# Patient Record
Sex: FEMALE | Race: WHITE | NOT HISPANIC OR LATINO | Employment: OTHER | URBAN - METROPOLITAN AREA
[De-identification: names, ages, dates, MRNs, and addresses within clinical notes are randomized per-mention and may not be internally consistent; named-entity substitution may affect disease eponyms.]

---

## 2020-08-06 ENCOUNTER — TELEPHONE (OUTPATIENT)
Dept: SURGERY | Facility: CLINIC | Age: 80
End: 2020-08-06

## 2020-08-07 ENCOUNTER — TELEPHONE (OUTPATIENT)
Dept: SURGERY | Facility: CLINIC | Age: 80
End: 2020-08-07

## 2020-08-10 ENCOUNTER — INITIAL CONSULT (OUTPATIENT)
Dept: SURGERY | Facility: CLINIC | Age: 80
End: 2020-08-10
Payer: MEDICARE

## 2020-08-10 VITALS
DIASTOLIC BLOOD PRESSURE: 85 MMHG | WEIGHT: 143.88 LBS | TEMPERATURE: 98 F | BODY MASS INDEX: 25.49 KG/M2 | HEART RATE: 96 BPM | SYSTOLIC BLOOD PRESSURE: 139 MMHG | HEIGHT: 63 IN

## 2020-08-10 DIAGNOSIS — K31.89 GASTRIC MASS: ICD-10-CM

## 2020-08-10 DIAGNOSIS — K80.10 CALCULUS OF GALLBLADDER WITH CHRONIC CHOLECYSTITIS WITHOUT OBSTRUCTION: ICD-10-CM

## 2020-08-10 PROCEDURE — 1126F PR PAIN SEVERITY QUANTIFIED, NO PAIN PRESENT: ICD-10-PCS | Mod: S$GLB,,, | Performed by: SURGERY

## 2020-08-10 PROCEDURE — 1126F AMNT PAIN NOTED NONE PRSNT: CPT | Mod: S$GLB,,, | Performed by: SURGERY

## 2020-08-10 PROCEDURE — 99999 PR PBB SHADOW E&M-EST. PATIENT-LVL V: CPT | Mod: PBBFAC,,, | Performed by: SURGERY

## 2020-08-10 PROCEDURE — 1159F MED LIST DOCD IN RCRD: CPT | Mod: S$GLB,,, | Performed by: SURGERY

## 2020-08-10 PROCEDURE — 1101F PR PT FALLS ASSESS DOC 0-1 FALLS W/OUT INJ PAST YR: ICD-10-PCS | Mod: CPTII,S$GLB,, | Performed by: SURGERY

## 2020-08-10 PROCEDURE — 99204 PR OFFICE/OUTPT VISIT, NEW, LEVL IV, 45-59 MIN: ICD-10-PCS | Mod: S$GLB,,, | Performed by: SURGERY

## 2020-08-10 PROCEDURE — 99999 PR PBB SHADOW E&M-EST. PATIENT-LVL V: ICD-10-PCS | Mod: PBBFAC,,, | Performed by: SURGERY

## 2020-08-10 PROCEDURE — 99204 OFFICE O/P NEW MOD 45 MIN: CPT | Mod: S$GLB,,, | Performed by: SURGERY

## 2020-08-10 PROCEDURE — 1101F PT FALLS ASSESS-DOCD LE1/YR: CPT | Mod: CPTII,S$GLB,, | Performed by: SURGERY

## 2020-08-10 PROCEDURE — 1159F PR MEDICATION LIST DOCUMENTED IN MEDICAL RECORD: ICD-10-PCS | Mod: S$GLB,,, | Performed by: SURGERY

## 2020-08-10 RX ORDER — IBUPROFEN 100 MG/5ML
1000 SUSPENSION, ORAL (FINAL DOSE FORM) ORAL DAILY
COMMUNITY

## 2020-08-10 RX ORDER — PHENTERMINE HYDROCHLORIDE 37.5 MG/1
37.5 TABLET ORAL
COMMUNITY
Start: 2020-05-13 | End: 2020-08-26

## 2020-08-10 RX ORDER — BUSPIRONE HYDROCHLORIDE 15 MG/1
15 TABLET ORAL
COMMUNITY
Start: 2000-08-08

## 2020-08-10 RX ORDER — CICLOPIROX 80 MG/ML
SOLUTION TOPICAL NIGHTLY
COMMUNITY
Start: 2020-07-20

## 2020-08-10 RX ORDER — DENOSUMAB 60 MG/ML
60 INJECTION SUBCUTANEOUS
COMMUNITY
Start: 2017-09-14

## 2020-08-10 RX ORDER — TURMERIC 400 MG
400 CAPSULE ORAL DAILY
COMMUNITY
End: 2021-12-27

## 2020-08-10 RX ORDER — FAMOTIDINE 20 MG/1
20 TABLET, FILM COATED ORAL
COMMUNITY
Start: 2020-07-23 | End: 2020-09-14

## 2020-08-10 RX ORDER — ASPIRIN 81 MG/1
81 TABLET ORAL 2 TIMES DAILY
COMMUNITY
Start: 2018-12-21 | End: 2021-12-27

## 2020-08-10 RX ORDER — CALCIUM CITRATE/VITAMIN D3 315MG-6.25
TABLET ORAL DAILY
COMMUNITY

## 2020-08-10 RX ORDER — CHOLECALCIFEROL (VITAMIN D3) 25 MCG
2000 TABLET ORAL DAILY
COMMUNITY

## 2020-08-10 RX ORDER — DICLOFENAC SODIUM 1 MG/ML
1 SOLUTION/ DROPS OPHTHALMIC 4 TIMES DAILY
COMMUNITY
End: 2020-08-26

## 2020-08-10 RX ORDER — POLYETHYLENE GLYCOL 3350 17 G/17G
17 POWDER, FOR SOLUTION ORAL DAILY
COMMUNITY
Start: 2008-07-26

## 2020-08-10 RX ORDER — BUTYROSPERMUM PARKII(SHEA BUTTER), SIMMONDSIA CHINENSIS (JOJOBA) SEED OIL, ALOE BARBADENSIS LEAF EXTRACT .01; 1; 3.5 G/100G; G/100G; G/100G
100 LIQUID TOPICAL DAILY
COMMUNITY

## 2020-08-10 RX ORDER — EZETIMIBE 10 MG/1
10 TABLET ORAL DAILY
COMMUNITY
Start: 2020-07-23

## 2020-08-10 NOTE — PROGRESS NOTES
History & Physical    SUBJECTIVE:     History of Present Illness:  Patient is a 80 y.o. female presents with a gastric mass found on imaging. She was initially evaluated in the ED at [place] in July for abdominal pain and nausea and got a RUQUS which demonstrated gallstones along with a mass which on follow-up CT was originating from the lesser curve of the stomach. She states she has had 3 episodes of acute epigastric pain with radiation to her back over the last 2 months. Each episode lasts about 5 hours. Otherwise eating well, denies diarrhea/constipation/melena/hematochezia. Lost 25lbs over last 6 months on account of dieting. Denies fevers/chills. Walks with a cane. Had a similar episode of pain about 1.5 years ago and was attributed to large gallstone in neck of gallbladder.     Chief Complaint   Patient presents with    Consult       Review of patient's allergies indicates:   Allergen Reactions    Statins-hmg-coa reductase inhibitors Other (See Comments)       Current Outpatient Medications   Medication Sig Dispense Refill    ascorbic acid, vitamin C, (VITAMIN C) 1000 MG tablet       calcium citrate-vitamin D3 (CITRACAL + D MAXIMUM) 315 mg- 250 unit Tab       ciclopirox (PENLAC) 8 % Soln       diclofenac (VOLTAREN) 0.1 % ophthalmic solution 1 drop 4 (four) times daily.      ezetimibe (ZETIA) 10 mg tablet       famotidine (PEPCID) 20 MG tablet       multivitamin capsule Take 1 capsule by mouth once daily. PT TAKING 50+ WOMEN VIT      phentermine (ADIPEX-P) 37.5 mg tablet       phenylephrine (SUDAFED PE) 10 MG Tab       polyethylene glycol (MIRALAX) 17 gram/dose powder       Saccharomyces boulardii (FLORASTOR) 250 mg capsule Take 50 mg by mouth once daily.      suvorexant (BELSOMRA) 5 mg Tab Take 5 mg by mouth.      turmeric 400 mg Cap       vitamin D (VITAMIN D3) 1000 units Tab Take 2,000 Units by mouth.      aspirin (ECOTRIN) 81 MG EC tablet 81 mg.      busPIRone (BUSPAR) 15 MG tablet  "Take 15 mg by mouth. PT TAKING 1/3 OF PILL AS  NEEDED.L 5 MG      denosumab (PROLIA) 60 mg/mL Syrg PT GET INJECTION EVERY SIX MONTHS.       No current facility-administered medications for this visit.        Past Medical History:   Diagnosis Date    Breast CA     Hypercholesteremia      Past Surgical History:   Procedure Laterality Date    BREAST LUMPECTOMY      SUBTOTAL COLECTOMY       Family History   Problem Relation Age of Onset    Colon cancer Father     Hypertension Other      Social History     Tobacco Use    Smoking status: Never Smoker    Smokeless tobacco: Never Used   Substance Use Topics    Alcohol use: Yes     Alcohol/week: 0.0 standard drinks     Comment: occasionally    Drug use: No        Review of Systems:  Review of Systems   Constitutional: Negative for activity change, appetite change, chills, diaphoresis, fatigue, fever and unexpected weight change.   HENT: Negative for congestion, nosebleeds and trouble swallowing.    Eyes: Negative for visual disturbance.   Respiratory: Negative for cough, chest tightness and shortness of breath.    Cardiovascular: Negative for chest pain, palpitations and leg swelling.   Gastrointestinal: Positive for abdominal pain and nausea. Negative for abdominal distention, constipation, diarrhea and vomiting.   Genitourinary: Negative for decreased urine volume, difficulty urinating and dysuria (endorses foul smelling urine).   Musculoskeletal: Negative for gait problem, joint swelling and myalgias.   Skin: Negative for color change, pallor and rash.   Neurological: Negative for dizziness, numbness and headaches.   Psychiatric/Behavioral: Negative for agitation and confusion.       OBJECTIVE:     Vital Signs (Most Recent)  Temp: 97.6 °F (36.4 °C) (08/10/20 1514)  Pulse: 96 (08/10/20 1514)  BP: 139/85 (08/10/20 1514)  5' 3" (1.6 m)  65.3 kg (143 lb 14.4 oz)     Physical Exam:  Physical Exam  Constitutional:       General: She is not in acute distress.     " Appearance: Normal appearance. She is not ill-appearing, toxic-appearing or diaphoretic.   HENT:      Head: Normocephalic and atraumatic.      Nose: Nose normal.      Mouth/Throat:      Mouth: Mucous membranes are moist.      Pharynx: Oropharynx is clear.   Eyes:      Extraocular Movements: Extraocular movements intact.      Conjunctiva/sclera: Conjunctivae normal.      Pupils: Pupils are equal, round, and reactive to light.   Neck:      Musculoskeletal: Normal range of motion and neck supple.   Cardiovascular:      Rate and Rhythm: Normal rate and regular rhythm.      Pulses: Normal pulses.   Pulmonary:      Effort: Pulmonary effort is normal.      Breath sounds: Normal breath sounds.   Abdominal:      General: Abdomen is flat. There is no distension.      Palpations: Abdomen is soft.      Tenderness: There is abdominal tenderness (mild tenderness to deep palpation of epigastrium). There is no guarding or rebound.      Hernia: No hernia is present.   Musculoskeletal: Normal range of motion.   Skin:     General: Skin is warm and dry.      Capillary Refill: Capillary refill takes less than 2 seconds.   Neurological:      General: No focal deficit present.      Mental Status: She is alert and oriented to person, place, and time. Mental status is at baseline.   Psychiatric:         Mood and Affect: Mood normal.         Behavior: Behavior normal.         Thought Content: Thought content normal.         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 Also with what sounds like symptomatic cholelithiasis. Given size of tumor, and question of possible GIST vs lymphoma, we discussed EGD/bx as next  steps. If GIST is confirmed, can proceed with scheduling resection in addition to cholecystectomy at the same time.    PLAN:     -GI referral for EGD and biopsy  -Follow-up to review results and schedule gastric resection and cholecystectomy   -May continue taking current medications including aspirin

## 2020-08-10 NOTE — LETTER
August 10, 2020        Javier Coats MD  [address] [place] [address] Blvd  [address]  [facility]  [place] [address]             [facility]  [address]  [facility] [address]  Phone: [phone]   Patient: Simi Encinas   MR Number: [id number]   YOB: 1940   Date of Visit: 8/10/2020       Dear Dr. Coats:    Thank you for referring Simi Encinas to me for evaluation. Attached you will find relevant portions of my assessment and plan of care.    If you have questions, please do not hesitate to call me. I look forward to following Simi Encinas along with you.    Sincerely,      Chris Maldonado MD            CC  No Recipients    Enclosure

## 2020-08-10 NOTE — PROGRESS NOTES
Patient seen with Dr Yepez. 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 She underwent ultrasound which confirmed gallstones but also showed a mass in the pancreas or stomach. Subsequent CT scan, which I have personally reviewed, shows a 6-7 cm lobulated, exophytic mass which appears to arise from the posterior wall of the gastric body, suggesting a GIST tumor.   No evidence of metastatic disease. The tumor is clearly resectable and does not appear to invade the pancres      Long talk with Ms Encinas and her daughter in law. I have recommended EGD and biopsy to confirm the diagnosis (if this were a gastric lymphoma her treatment would be very different) followed by partial gastrectomy and cholecystectomy. I have told her that she is likely to need Gleevec therapy postop. She would like to see Dr Tomlinson, who is taking care of a family member.     She would also like to see Dr Spencer for her EGD and I will help to arrange this.     Copy Dr Spencer

## 2020-08-11 ENCOUNTER — TELEPHONE (OUTPATIENT)
Dept: SURGERY | Facility: CLINIC | Age: 80
End: 2020-08-11

## 2020-08-11 RX ORDER — DICLOFENAC SODIUM 10 MG/G
1 GEL TOPICAL 4 TIMES DAILY PRN
COMMUNITY

## 2020-08-13 ENCOUNTER — TELEPHONE (OUTPATIENT)
Dept: SURGERY | Facility: CLINIC | Age: 80
End: 2020-08-13

## 2020-08-18 ENCOUNTER — TELEPHONE (OUTPATIENT)
Dept: SURGERY | Facility: CLINIC | Age: 80
End: 2020-08-18

## 2020-08-24 ENCOUNTER — TELEPHONE (OUTPATIENT)
Dept: SURGERY | Facility: CLINIC | Age: 80
End: 2020-08-24

## 2020-08-24 NOTE — TELEPHONE ENCOUNTER
----- Message from Adriane Flores sent at 8/24/2020 11:10 AM CDT -----  Regarding: Pt  Reason: Pt calling to schedule surgery for 9/1. Please call     Communication: [phone]

## 2020-08-25 DIAGNOSIS — Z01.818 PRE-OP TESTING: ICD-10-CM

## 2020-08-25 DIAGNOSIS — K31.89 GASTRIC MASS: Primary | ICD-10-CM

## 2020-08-26 ENCOUNTER — OFFICE VISIT (OUTPATIENT)
Dept: SURGERY | Facility: CLINIC | Age: 80
End: 2020-08-26
Payer: MEDICARE

## 2020-08-26 ENCOUNTER — HOSPITAL ENCOUNTER (OUTPATIENT)
Dept: CARDIOLOGY | Facility: HOSPITAL | Age: 80
Discharge: HOME | End: 2020-08-26
Attending: INTERNAL MEDICINE
Payer: MEDICARE

## 2020-08-26 ENCOUNTER — HOSPITAL ENCOUNTER (OUTPATIENT)
Dept: CARDIOLOGY | Facility: CLINIC | Age: 80
Discharge: HOME | End: 2020-08-26
Payer: MEDICARE

## 2020-08-26 ENCOUNTER — OFFICE VISIT (OUTPATIENT)
Dept: CARDIOLOGY | Facility: CLINIC | Age: 80
End: 2020-08-26
Payer: MEDICARE

## 2020-08-26 VITALS
WEIGHT: 141.13 LBS | SYSTOLIC BLOOD PRESSURE: 111 MMHG | DIASTOLIC BLOOD PRESSURE: 75 MMHG | HEART RATE: 93 BPM | BODY MASS INDEX: 25.01 KG/M2 | TEMPERATURE: 98 F | HEIGHT: 63 IN

## 2020-08-26 VITALS
HEIGHT: 63 IN | DIASTOLIC BLOOD PRESSURE: 63 MMHG | HEART RATE: 87 BPM | WEIGHT: 141.56 LBS | SYSTOLIC BLOOD PRESSURE: 112 MMHG | BODY MASS INDEX: 25.08 KG/M2

## 2020-08-26 DIAGNOSIS — C49.A2 MALIGNANT GASTROINTESTINAL STROMAL TUMOR (GIST) OF STOMACH: ICD-10-CM

## 2020-08-26 DIAGNOSIS — R09.89 CAROTID BRUIT, UNSPECIFIED LATERALITY: ICD-10-CM

## 2020-08-26 DIAGNOSIS — Z01.810 PREOPERATIVE CARDIOVASCULAR EXAMINATION: ICD-10-CM

## 2020-08-26 DIAGNOSIS — K31.89 GASTRIC MASS: Primary | ICD-10-CM

## 2020-08-26 DIAGNOSIS — K80.10 CALCULUS OF GALLBLADDER WITH CHRONIC CHOLECYSTITIS WITHOUT OBSTRUCTION: Primary | ICD-10-CM

## 2020-08-26 DIAGNOSIS — K31.89 GASTRIC MASS: ICD-10-CM

## 2020-08-26 DIAGNOSIS — Z01.810 PREOPERATIVE CARDIOVASCULAR EXAMINATION: Primary | ICD-10-CM

## 2020-08-26 LAB
LEFT CBA DIAS: 14 CM/S
LEFT CBA SYS: 65 CM/S
LEFT CCA DIST DIAS: 13 CM/S
LEFT CCA DIST SYS: 60 CM/S
LEFT CCA MID DIAS: 13 CM/S
LEFT CCA MID SYS: 78 CM/S
LEFT CCA PROX DIAS: 10 CM/S
LEFT CCA PROX SYS: 84 CM/S
LEFT ECA DIAS: 0 CM/S
LEFT ECA SYS: 67 CM/S
LEFT ICA DIST DIAS: 16 CM/S
LEFT ICA DIST SYS: 61 CM/S
LEFT ICA MID DIAS: 19 CM/S
LEFT ICA MID SYS: 76 CM/S
LEFT ICA PROX DIAS: 26 CM/S
LEFT ICA PROX SYS: 88 CM/S
LEFT VERTEBRAL DIAS: 18 CM/S
LEFT VERTEBRAL SYS: 59 CM/S
OHS CV CAROTID RIGHT ICA EDV HIGHEST: 23
OHS CV CAROTID ULTRASOUND LEFT ICA/CCA RATIO: 1.47
OHS CV CAROTID ULTRASOUND RIGHT ICA/CCA RATIO: 1.48
OHS CV PV CAROTID LEFT HIGHEST CCA: 84
OHS CV PV CAROTID LEFT HIGHEST ICA: 88
OHS CV PV CAROTID RIGHT HIGHEST CCA: 80
OHS CV PV CAROTID RIGHT HIGHEST ICA: 99
OHS CV US CAROTID LEFT HIGHEST EDV: 26
RIGHT ARM DIASTOLIC BLOOD PRESSURE: 63 MMHG
RIGHT ARM SYSTOLIC BLOOD PRESSURE: 112 MMHG
RIGHT CBA DIAS: 10 CM/S
RIGHT CBA SYS: 60 CM/S
RIGHT CCA DIST DIAS: 12 CM/S
RIGHT CCA DIST SYS: 67 CM/S
RIGHT CCA MID DIAS: 13 CM/S
RIGHT CCA MID SYS: 80 CM/S
RIGHT CCA PROX DIAS: 8 CM/S
RIGHT CCA PROX SYS: 80 CM/S
RIGHT ECA DIAS: 0 CM/S
RIGHT ECA SYS: 67 CM/S
RIGHT ICA DIST DIAS: 18 CM/S
RIGHT ICA DIST SYS: 68 CM/S
RIGHT ICA MID DIAS: 23 CM/S
RIGHT ICA MID SYS: 99 CM/S
RIGHT ICA PROX DIAS: 15 CM/S
RIGHT ICA PROX SYS: 68 CM/S
RIGHT VERTEBRAL DIAS: 10 CM/S
RIGHT VERTEBRAL SYS: 49 CM/S

## 2020-08-26 PROCEDURE — 99999 PR PBB SHADOW E&M-EST. PATIENT-LVL IV: ICD-10-PCS | Mod: PBBFAC,,, | Performed by: SURGERY

## 2020-08-26 PROCEDURE — 93880 EXTRACRANIAL BILAT STUDY: CPT

## 2020-08-26 PROCEDURE — 93000 EKG 12-LEAD: ICD-10-PCS | Mod: S$GLB,,, | Performed by: INTERNAL MEDICINE

## 2020-08-26 PROCEDURE — 1126F PR PAIN SEVERITY QUANTIFIED, NO PAIN PRESENT: ICD-10-PCS | Mod: S$GLB,,, | Performed by: INTERNAL MEDICINE

## 2020-08-26 PROCEDURE — 93000 ELECTROCARDIOGRAM COMPLETE: CPT | Mod: S$GLB,,, | Performed by: INTERNAL MEDICINE

## 2020-08-26 PROCEDURE — 93880 CV US DOPPLER CAROTID (CUPID ONLY): ICD-10-PCS | Mod: 26,,, | Performed by: INTERNAL MEDICINE

## 2020-08-26 PROCEDURE — 1159F PR MEDICATION LIST DOCUMENTED IN MEDICAL RECORD: ICD-10-PCS | Mod: S$GLB,,, | Performed by: INTERNAL MEDICINE

## 2020-08-26 PROCEDURE — 99499 UNLISTED E&M SERVICE: CPT | Mod: S$GLB,,, | Performed by: SURGERY

## 2020-08-26 PROCEDURE — 1126F AMNT PAIN NOTED NONE PRSNT: CPT | Mod: S$GLB,,, | Performed by: INTERNAL MEDICINE

## 2020-08-26 PROCEDURE — 99999 PR PBB SHADOW E&M-EST. PATIENT-LVL V: ICD-10-PCS | Mod: PBBFAC,,, | Performed by: INTERNAL MEDICINE

## 2020-08-26 PROCEDURE — 99999 PR PBB SHADOW E&M-EST. PATIENT-LVL V: CPT | Mod: PBBFAC,,, | Performed by: INTERNAL MEDICINE

## 2020-08-26 PROCEDURE — 99203 PR OFFICE/OUTPT VISIT, NEW, LEVL III, 30-44 MIN: ICD-10-PCS | Mod: S$GLB,,, | Performed by: INTERNAL MEDICINE

## 2020-08-26 PROCEDURE — 99499 NO LOS: ICD-10-PCS | Mod: S$GLB,,, | Performed by: SURGERY

## 2020-08-26 PROCEDURE — 99999 PR PBB SHADOW E&M-EST. PATIENT-LVL IV: CPT | Mod: PBBFAC,,, | Performed by: SURGERY

## 2020-08-26 PROCEDURE — 1159F MED LIST DOCD IN RCRD: CPT | Mod: S$GLB,,, | Performed by: INTERNAL MEDICINE

## 2020-08-26 PROCEDURE — 1101F PR PT FALLS ASSESS DOC 0-1 FALLS W/OUT INJ PAST YR: ICD-10-PCS | Mod: CPTII,S$GLB,, | Performed by: INTERNAL MEDICINE

## 2020-08-26 PROCEDURE — 93880 EXTRACRANIAL BILAT STUDY: CPT | Mod: 26,,, | Performed by: INTERNAL MEDICINE

## 2020-08-26 PROCEDURE — 1101F PT FALLS ASSESS-DOCD LE1/YR: CPT | Mod: CPTII,S$GLB,, | Performed by: INTERNAL MEDICINE

## 2020-08-26 PROCEDURE — 99203 OFFICE O/P NEW LOW 30 MIN: CPT | Mod: S$GLB,,, | Performed by: INTERNAL MEDICINE

## 2020-08-26 RX ORDER — METRONIDAZOLE 500 MG/100ML
500 INJECTION, SOLUTION INTRAVENOUS
Status: CANCELLED | OUTPATIENT
Start: 2020-09-01

## 2020-08-26 RX ORDER — SODIUM CHLORIDE 9 MG/ML
INJECTION, SOLUTION INTRAVENOUS CONTINUOUS
Status: CANCELLED | OUTPATIENT
Start: 2020-08-26

## 2020-08-26 RX ORDER — ENOXAPARIN SODIUM 100 MG/ML
40 INJECTION SUBCUTANEOUS EVERY 24 HOURS
Status: CANCELLED | OUTPATIENT
Start: 2020-08-26

## 2020-08-26 RX ORDER — PANTOPRAZOLE SODIUM 40 MG/1
TABLET, DELAYED RELEASE ORAL
COMMUNITY
Start: 2020-08-18

## 2020-08-26 RX ORDER — LIDOCAINE HYDROCHLORIDE 10 MG/ML
1 INJECTION, SOLUTION EPIDURAL; INFILTRATION; INTRACAUDAL; PERINEURAL ONCE
Status: CANCELLED | OUTPATIENT
Start: 2020-08-26 | End: 2020-08-26

## 2020-08-26 NOTE — PROGRESS NOTES
SUBJECTIVE:      History of Present Illness:  Patient is a 80 y.o. female presents with a gastric mass found on imaging. She was initially evaluated in the ED at [place] in July for abdominal pain and nausea and got a RUQUS which demonstrated gallstones along with a mass which on follow-up CT was originating from the lesser curve of the stomach. She states she has had 3 episodes of acute epigastric pain with radiation to her back over the last 2 months. Each episode lasts about 5 hours. Otherwise eating well, denies diarrhea/constipation/melena/hematochezia. Lost 25lbs over last 6 months on account of dieting. Denies fevers/chills. Walks with a cane. Had a similar episode of pain about 1.5 years ago and was attributed to large gallstone in neck of gallbladder.     8/26/20 clinic follow-up after EGD with biopsy for preoperative planning. Feeling well today, no current abdominal pain, no nausea/vomiting, normal BMs, denies fevers/chills/cough/dyspnea.         Chief Complaint   Patient presents with    Consult              Review of patient's allergies indicates:   Allergen Reactions    Statins-hmg-coa reductase inhibitors Other (See Comments)         Current Medications          Current Outpatient Medications   Medication Sig Dispense Refill    ascorbic acid, vitamin C, (VITAMIN C) 1000 MG tablet          calcium citrate-vitamin D3 (CITRACAL + D MAXIMUM) 315 mg- 250 unit Tab          ciclopirox (PENLAC) 8 % Soln          diclofenac (VOLTAREN) 0.1 % ophthalmic solution 1 drop 4 (four) times daily.        ezetimibe (ZETIA) 10 mg tablet          famotidine (PEPCID) 20 MG tablet          multivitamin capsule Take 1 capsule by mouth once daily. PT TAKING 50+ WOMEN VIT        phentermine (ADIPEX-P) 37.5 mg tablet          phenylephrine (SUDAFED PE) 10 MG Tab          polyethylene glycol (MIRALAX) 17 gram/dose powder          Saccharomyces boulardii (FLORASTOR) 250 mg capsule Take 50 mg by mouth once  daily.        suvorexant (BELSOMRA) 5 mg Tab Take 5 mg by mouth.        turmeric 400 mg Cap          vitamin D (VITAMIN D3) 1000 units Tab Take 2,000 Units by mouth.        aspirin (ECOTRIN) 81 MG EC tablet 81 mg.        busPIRone (BUSPAR) 15 MG tablet Take 15 mg by mouth. PT TAKING 1/3 OF PILL AS  NEEDED.L 5 MG        denosumab (PROLIA) 60 mg/mL Syrg PT GET INJECTION EVERY SIX MONTHS.          No current facility-administered medications for this visit.                 Past Medical History:   Diagnosis Date    Breast CA      Hypercholesteremia              Past Surgical History:   Procedure Laterality Date    BREAST LUMPECTOMY        SUBTOTAL COLECTOMY                Family History   Problem Relation Age of Onset    Colon cancer Father      Hypertension Other        Social History            Tobacco Use    Smoking status: Never Smoker    Smokeless tobacco: Never Used   Substance Use Topics    Alcohol use: Yes       Alcohol/week: 0.0 standard drinks       Comment: occasionally    Drug use: No         Review of Systems:  Review of Systems   Constitutional: Negative for activity change, appetite change, chills, diaphoresis, fatigue, fever and unexpected weight change.   HENT: Negative for congestion, nosebleeds and trouble swallowing.    Eyes: Negative for visual disturbance.   Respiratory: Negative for cough, chest tightness and shortness of breath.    Cardiovascular: Negative for chest pain, palpitations and leg swelling.   Gastrointestinal: Positive for abdominal pain and nausea. Negative for abdominal distention, constipation, diarrhea and vomiting.   Genitourinary: Negative for decreased urine volume, difficulty urinating and dysuria   Musculoskeletal: Negative for gait problem, joint swelling and myalgias.   Skin: Negative for color change, pallor and rash.   Neurological: Negative for dizziness, numbness and headaches.   Psychiatric/Behavioral: Negative for agitation and confusion.          OBJECTIVE:      Vital Signs (Most Recent)  See nursing flowsheet, vitals reviewed     Physical Exam  Constitutional:       General: She is not in acute distress.     Appearance: Normal appearance. She is not ill-appearing, toxic-appearing or diaphoretic.   HENT:      Head: Normocephalic and atraumatic.      Nose: Nose normal.      Mouth/Throat:      Mouth: Mucous membranes are moist.      Pharynx: Oropharynx is clear.   Eyes:      Extraocular Movements: Extraocular movements intact.      Conjunctiva/sclera: Conjunctivae normal.      Pupils: Pupils are equal, round, and reactive to light.   Neck:      Musculoskeletal: Normal range of motion and neck supple.   Cardiovascular:      Rate and Rhythm: Normal rate and regular rhythm.      Pulses: Normal pulses.   Pulmonary:      Effort: Pulmonary effort is normal.      Breath sounds: Normal breath sounds.   Abdominal:      General: Abdomen is flat. There is no distension.      Palpations: Abdomen is soft.      Tenderness: There is abdominal tenderness (mild tenderness to deep palpation of epigastrium). There is no guarding or rebound.      Hernia: No hernia is present.   Musculoskeletal: Normal range of motion.   Skin:     General: Skin is warm and dry.      Capillary Refill: Capillary refill takes less than 2 seconds.   Neurological:      General: No focal deficit present.      Mental Status: She is alert and oriented to person, place, and time. Mental status is at baseline.   Psychiatric:         Mood and Affect: Mood normal.         Behavior: Behavior normal.         Thought Content: Thought content normal.         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 Also with what sounds like symptomatic cholelithiasis. We reviewed risks, benefits, and alternative to surgery and the patient consented for surgery. Will proceed with scheduling partial gastrectomy in addition to cholecystectomy at the same time for 9/1/20.     PLAN:     -Partial gastrectomy/wedge resection, EGD, and cholecystectomy 9/1/20  -Preoperative labs ordered for morning of surgery, EKG today, COVID-19 ordered pre-op  -May continue taking current medications    I have personally taken the history and examined this patient and agree with the resident's note as stated above.  I discussed the surgical plan with Ms Encinas and her daughter: partial gastrectomy and cholecystectomy using a robotic minimally-invasive approach. She understands that open conversion may be needed. She is also aware that she will need to see Dr Tomlinson postop and will probably need adjuvant Gleevec therapy.

## 2020-08-26 NOTE — LETTER
August 26, 2020      Chris Maldonado MD  [address]  [facility] [address]           [facility] 3rd Floor  [address]  [facility] [address]  Phone: [phone]          Patient: Simi Encinas   MR Number: [id number]   YOB: 1940   Date of Visit: 8/26/2020       Dear Dr. Chris Maldonado:    Thank you for referring Simi Encinas to me for evaluation. Attached you will find relevant portions of my assessment and plan of care.    If you have questions, please do not hesitate to call me. I look forward to following Simi Encinas along with you.    Sincerely,    Mau Soler MD    Enclosure  CC:  No Recipients    If you would like to receive this communication electronically, please contact [email or web address] or [phone] to request more information on [employer] access.    For providers and/or their staff who would like to refer a patient to [facility], please contact us through our one-stop-shop provider referral line, [facility], at [phone].    If you feel you have received this communication in error or would no longer like to receive these types of communications, please e-mail [email or web address]

## 2020-08-26 NOTE — PROGRESS NOTES
Subjective:   Patient ID:  Simi Encinas is a 80 y.o. 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  Her symptoms were from the gall bladder and the stomach malignancy was a fortuitous incidental finding.    She overall is doing well with no new symptoms or cardiovascular complaints and no change in exercise capacity.  She denies chest discomfort, BO, palpitations, PND/orthopnea, lightheadedness and syncope.    She mowed her lawn yesterday without difficulty.      Past Medical History:   Diagnosis Date    Breast CA     Hypercholesteremia        Past Surgical History:   Procedure Laterality Date    BREAST LUMPECTOMY      SUBTOTAL COLECTOMY         Social History     Tobacco Use    Smoking status: Never Smoker    Smokeless tobacco: Never Used   Substance Use Topics    Alcohol use: Yes     Alcohol/week: 0.0 standard drinks     Comment: occasionally    Drug use: No       Family History   Problem Relation Age of Onset    Colon cancer Father     Hyperlipidemia Father     Heart attack Father     Heart disease Father     Hypertension Father     Hyperlipidemia Mother     Heart disease Mother     Hypertension Mother     Hypertension Other        Current Outpatient Medications   Medication Sig    ascorbic acid, vitamin C, (VITAMIN C) 1000 MG tablet Take 1,000 mg by mouth once daily.     aspirin (ECOTRIN) 81 MG EC tablet Take 81 mg by mouth 2 (two) times daily.     busPIRone (BUSPAR) 15 MG tablet Take 15 mg by mouth. PT TAKING 1/3 OF PILL AS  NEEDED 5 MG    calcium citrate-vitamin D3 (CITRACAL + D MAXIMUM) 315 mg- 250 unit Tab Take by mouth once daily.     ciclopirox (PENLAC) 8 % Soln Apply topically nightly. Left great toe    denosumab (PROLIA) 60 mg/mL Syrg Inject 60 mg into the skin every 6 (six) months. PT GET INJECTION EVERY SIX  MONTHS.last dose 5/2020    diclofenac sodium (VOLTAREN) 1 % Gel Apply 1 g topically 4 (four) times daily as needed (To on bilateal knees if needed.).    ezetimibe (ZETIA) 10 mg tablet Take 10 mg by mouth once daily. allergic to statins    famotidine (PEPCID) 20 MG tablet Take 20 mg by mouth.     multivitamin capsule Take 1 capsule by mouth once daily. PT TAKING 50+ WOMEN VIT    pantoprazole (PROTONIX) 40 MG tablet TK 1 T PO QAM    phenylephrine (SUDAFED PE) 10 MG Tab Take 10 mg by mouth every 4 (four) hours as needed. sinuses    polyethylene glycol (MIRALAX) 17 gram/dose powder Take 17 g by mouth once daily.     Saccharomyces boulardii (FLORASTOR) 250 mg capsule Take 100 mg by mouth once daily. Women's probiotics    suvorexant (BELSOMRA) 5 mg Tab Take 5 mg by mouth nightly as needed (insomina if needed).     turmeric 400 mg Cap Take 400 mg by mouth once daily.     vitamin D (VITAMIN D3) 1000 units Tab Take 2,000 Units by mouth once daily.      No current facility-administered medications for this visit.        Review of patient's allergies indicates:   Allergen Reactions    Statins-hmg-coa reductase inhibitors Other (See Comments)       Review of Systems   Constitution: Negative.   HENT: Negative.    Eyes: Negative.    Cardiovascular: Negative.  Negative for chest pain, dyspnea on exertion, near-syncope, orthopnea and palpitations.   Respiratory: Negative.  Negative for cough and shortness of breath.    Endocrine: Negative.    Hematologic/Lymphatic: Negative.    Skin: Negative.    Musculoskeletal: Negative.    Gastrointestinal: Positive for abdominal pain.   Genitourinary: Negative.    Neurological: Negative.    Psychiatric/Behavioral: Negative.      Objective:   Physical Exam   Constitutional: She is oriented to person, place, and time. She appears well-developed and well-nourished.   HENT:   Head: Normocephalic and atraumatic.   Mouth/Throat: Oropharynx is clear and moist.   Eyes: Conjunctivae and EOM  are normal. No scleral icterus.   Neck: Normal range of motion. Neck supple. No JVD present.   Cardiovascular: Normal rate, regular rhythm, normal heart sounds and intact distal pulses. Exam reveals no gallop and no friction rub.   No murmur heard.  Soft, brief carotid bruit over the right side of the neck   Pulmonary/Chest: Effort normal and breath sounds normal. She has no wheezes. She has no rales.   Abdominal: Soft. Bowel sounds are normal. She exhibits no distension. There is no abdominal tenderness.   Musculoskeletal: Normal range of motion.         General: No edema.   Neurological: She is alert and oriented to person, place, and time.   Skin: Skin is warm and dry. No rash noted. No erythema.   Psychiatric: She has a normal mood and affect. Her behavior is normal. Judgment and thought content normal.   Vitals reviewed.      Lab Results   Component Value Date    WBC 11.89 (H) 06/14/2008    HGB 12.0 06/14/2008    HCT 37.2 06/14/2008    MCV 87.5 06/14/2008     06/14/2008         Chemistry        Component Value Date/Time     06/14/2008 0310    K 3.8 06/14/2008 0310     06/14/2008 0310    CO2 22 (L) 06/14/2008 0310    BUN 8 06/14/2008 0310    CREATININE 0.8 06/14/2008 0310     (H) 06/14/2008 0310        Component Value Date/Time    CALCIUM 8.4 (L) 06/14/2008 0310            No results found for: CHOL  No results found for: HDL  No results found for: LDLCALC  No results found for: TRIG  No results found for: CHOLHDL    No results found for: TSH    No results found for: HGBA1C      Assessment:     1. Preoperative cardiovascular examination    2. Gastric mass    3.      Right carotid bruit    Plan:     She has no angina, heart failure, or unstable arrhythmia.  No further cardiovascular testing is required prior to proceeding to the operating room.    Carotid u/s given subtle right neck bruit.  This is not an impediment to proceeding with the operation.    Diet/exercise goals  reinforced.    Hand washing and social distancing stressed.

## 2020-08-29 ENCOUNTER — LAB VISIT (OUTPATIENT)
Dept: URGENT CARE | Facility: CLINIC | Age: 80
DRG: 327 | End: 2020-08-29
Payer: MEDICARE

## 2020-08-29 ENCOUNTER — ANESTHESIA EVENT (OUTPATIENT)
Dept: SURGERY | Facility: HOSPITAL | Age: 80
DRG: 327 | End: 2020-08-29
Payer: MEDICARE

## 2020-08-29 VITALS — TEMPERATURE: 99 F

## 2020-08-29 DIAGNOSIS — Z01.818 PRE-OP TESTING: ICD-10-CM

## 2020-08-29 PROCEDURE — U0003 INFECTIOUS AGENT DETECTION BY NUCLEIC ACID (DNA OR RNA); SEVERE ACUTE RESPIRATORY SYNDROME CORONAVIRUS 2 (SARS-COV-2) (CORONAVIRUS DISEASE [COVID-19]), AMPLIFIED PROBE TECHNIQUE, MAKING USE OF HIGH THROUGHPUT TECHNOLOGIES AS DESCRIBED BY CMS-2020-01-R: HCPCS

## 2020-08-30 LAB — SARS-COV-2 RNA RESP QL NAA+PROBE: NOT DETECTED

## 2020-08-31 ENCOUNTER — TELEPHONE (OUTPATIENT)
Dept: SURGERY | Facility: CLINIC | Age: 80
End: 2020-08-31

## 2020-08-31 NOTE — TELEPHONE ENCOUNTER
Pre-operative instructions given to pt verbally. Instructed to park on second level in patient parking garage and report to Day of Surgery for 5 am. Taught to shower with Hibiclens/drink Recovery Aid tonight and morning of surgery. Instructed to wear comfortable clothes, bring robe and wear  shoes for after surgery. Instructed nothing to eat after midnight. Instructed the only medications to take before surgery is Protonix with sip of water. COVID negative. Pt verbalized understanding of instructions. Pt would like to speak with Dr. Maldonado, notified Dr. Maldonado.

## 2020-09-01 ENCOUNTER — ANESTHESIA (OUTPATIENT)
Dept: SURGERY | Facility: HOSPITAL | Age: 80
DRG: 327 | End: 2020-09-01
Payer: MEDICARE

## 2020-09-01 ENCOUNTER — HOSPITAL ENCOUNTER (INPATIENT)
Facility: HOSPITAL | Age: 80
LOS: 3 days | Discharge: HOME | DRG: 327 | End: 2020-09-04
Attending: SURGERY | Admitting: SURGERY
Payer: MEDICARE

## 2020-09-01 DIAGNOSIS — C49.A2 MALIGNANT GASTROINTESTINAL STROMAL TUMOR (GIST) OF STOMACH: ICD-10-CM

## 2020-09-01 DIAGNOSIS — K31.89 GASTRIC MASS: ICD-10-CM

## 2020-09-01 LAB
ABO + RH BLD: NORMAL
ALBUMIN SERPL BCP-MCNC: 3 G/DL (ref 3.5–5.2)
ALP SERPL-CCNC: 113 U/L (ref 55–135)
ALT SERPL W/O P-5'-P-CCNC: 24 U/L (ref 10–44)
ANION GAP SERPL CALC-SCNC: 8 MMOL/L (ref 8–16)
ANION GAP SERPL CALC-SCNC: 9 MMOL/L (ref 8–16)
AST SERPL-CCNC: 29 U/L (ref 10–40)
BASOPHILS # BLD AUTO: 0.04 K/UL (ref 0–0.2)
BASOPHILS # BLD AUTO: 0.07 K/UL (ref 0–0.2)
BASOPHILS NFR BLD: 0.2 % (ref 0–1.9)
BASOPHILS NFR BLD: 0.9 % (ref 0–1.9)
BILIRUB SERPL-MCNC: 0.3 MG/DL (ref 0.1–1)
BLD GP AB SCN CELLS X3 SERPL QL: NORMAL
BUN SERPL-MCNC: 13 MG/DL (ref 8–23)
BUN SERPL-MCNC: 13 MG/DL (ref 8–23)
CALCIUM SERPL-MCNC: 7.6 MG/DL (ref 8.7–10.5)
CALCIUM SERPL-MCNC: 9.5 MG/DL (ref 8.7–10.5)
CHLORIDE SERPL-SCNC: 103 MMOL/L (ref 95–110)
CHLORIDE SERPL-SCNC: 105 MMOL/L (ref 95–110)
CO2 SERPL-SCNC: 24 MMOL/L (ref 23–29)
CO2 SERPL-SCNC: 27 MMOL/L (ref 23–29)
CREAT SERPL-MCNC: 0.8 MG/DL (ref 0.5–1.4)
CREAT SERPL-MCNC: 0.8 MG/DL (ref 0.5–1.4)
DIFFERENTIAL METHOD: ABNORMAL
DIFFERENTIAL METHOD: ABNORMAL
EOSINOPHIL # BLD AUTO: 0 K/UL (ref 0–0.5)
EOSINOPHIL # BLD AUTO: 0.3 K/UL (ref 0–0.5)
EOSINOPHIL NFR BLD: 0.1 % (ref 0–8)
EOSINOPHIL NFR BLD: 4 % (ref 0–8)
ERYTHROCYTE [DISTWIDTH] IN BLOOD BY AUTOMATED COUNT: 13.5 % (ref 11.5–14.5)
ERYTHROCYTE [DISTWIDTH] IN BLOOD BY AUTOMATED COUNT: 13.6 % (ref 11.5–14.5)
EST. GFR  (AFRICAN AMERICAN): >60 ML/MIN/1.73 M^2
EST. GFR  (AFRICAN AMERICAN): >60 ML/MIN/1.73 M^2
EST. GFR  (NON AFRICAN AMERICAN): >60 ML/MIN/1.73 M^2
EST. GFR  (NON AFRICAN AMERICAN): >60 ML/MIN/1.73 M^2
GLUCOSE SERPL-MCNC: 105 MG/DL (ref 70–110)
GLUCOSE SERPL-MCNC: 214 MG/DL (ref 70–110)
HCT VFR BLD AUTO: 33.9 % (ref 37–48.5)
HCT VFR BLD AUTO: 36.3 % (ref 37–48.5)
HGB BLD-MCNC: 10.5 G/DL (ref 12–16)
HGB BLD-MCNC: 11.5 G/DL (ref 12–16)
IMM GRANULOCYTES # BLD AUTO: 0.04 K/UL (ref 0–0.04)
IMM GRANULOCYTES # BLD AUTO: 0.1 K/UL (ref 0–0.04)
IMM GRANULOCYTES NFR BLD AUTO: 0.5 % (ref 0–0.5)
IMM GRANULOCYTES NFR BLD AUTO: 0.6 % (ref 0–0.5)
LYMPHOCYTES # BLD AUTO: 0.8 K/UL (ref 1–4.8)
LYMPHOCYTES # BLD AUTO: 2 K/UL (ref 1–4.8)
LYMPHOCYTES NFR BLD: 24.6 % (ref 18–48)
LYMPHOCYTES NFR BLD: 5 % (ref 18–48)
MAGNESIUM SERPL-MCNC: 1.8 MG/DL (ref 1.6–2.6)
MCH RBC QN AUTO: 27.2 PG (ref 27–31)
MCH RBC QN AUTO: 27.3 PG (ref 27–31)
MCHC RBC AUTO-ENTMCNC: 31 G/DL (ref 32–36)
MCHC RBC AUTO-ENTMCNC: 31.7 G/DL (ref 32–36)
MCV RBC AUTO: 86 FL (ref 82–98)
MCV RBC AUTO: 88 FL (ref 82–98)
MONOCYTES # BLD AUTO: 0.3 K/UL (ref 0.3–1)
MONOCYTES # BLD AUTO: 0.8 K/UL (ref 0.3–1)
MONOCYTES NFR BLD: 1.8 % (ref 4–15)
MONOCYTES NFR BLD: 9.6 % (ref 4–15)
NEUTROPHILS # BLD AUTO: 15.4 K/UL (ref 1.8–7.7)
NEUTROPHILS # BLD AUTO: 4.9 K/UL (ref 1.8–7.7)
NEUTROPHILS NFR BLD: 60.4 % (ref 38–73)
NEUTROPHILS NFR BLD: 92.3 % (ref 38–73)
NRBC BLD-RTO: 0 /100 WBC
NRBC BLD-RTO: 0 /100 WBC
PHOSPHATE SERPL-MCNC: 2.9 MG/DL (ref 2.7–4.5)
PLATELET # BLD AUTO: 434 K/UL (ref 150–350)
PLATELET # BLD AUTO: 497 K/UL (ref 150–350)
PMV BLD AUTO: 10.5 FL (ref 9.2–12.9)
PMV BLD AUTO: 10.9 FL (ref 9.2–12.9)
POTASSIUM SERPL-SCNC: 4 MMOL/L (ref 3.5–5.1)
POTASSIUM SERPL-SCNC: 4.2 MMOL/L (ref 3.5–5.1)
PROT SERPL-MCNC: 7 G/DL (ref 6–8.4)
RBC # BLD AUTO: 3.85 M/UL (ref 4–5.4)
RBC # BLD AUTO: 4.23 M/UL (ref 4–5.4)
SODIUM SERPL-SCNC: 137 MMOL/L (ref 136–145)
SODIUM SERPL-SCNC: 139 MMOL/L (ref 136–145)
WBC # BLD AUTO: 16.71 K/UL (ref 3.9–12.7)
WBC # BLD AUTO: 8.02 K/UL (ref 3.9–12.7)

## 2020-09-01 PROCEDURE — 20600001 HC STEP DOWN PRIVATE ROOM

## 2020-09-01 PROCEDURE — 88304 TISSUE EXAM BY PATHOLOGIST: CPT | Mod: 26,,, | Performed by: PATHOLOGY

## 2020-09-01 PROCEDURE — 99900035 HC TECH TIME PER 15 MIN (STAT)

## 2020-09-01 PROCEDURE — 71000015 HC POSTOP RECOV 1ST HR: Performed by: SURGERY

## 2020-09-01 PROCEDURE — 88381 MICRODISSECTION MANUAL: CPT | Performed by: PATHOLOGY

## 2020-09-01 PROCEDURE — 88341 IMHCHEM/IMCYTCHM EA ADD ANTB: CPT | Performed by: PATHOLOGY

## 2020-09-01 PROCEDURE — 37000009 HC ANESTHESIA EA ADD 15 MINS: Performed by: SURGERY

## 2020-09-01 PROCEDURE — 63600175 PHARM REV CODE 636 W HCPCS: Performed by: STUDENT IN AN ORGANIZED HEALTH CARE EDUCATION/TRAINING PROGRAM

## 2020-09-01 PROCEDURE — 88342 CHG IMMUNOCYTOCHEMISTRY: ICD-10-PCS | Mod: 26,,, | Performed by: PATHOLOGY

## 2020-09-01 PROCEDURE — 43611 PR EXCIS STOAMCH MALIG: ICD-10-PCS | Mod: ,,, | Performed by: SURGERY

## 2020-09-01 PROCEDURE — 84100 ASSAY OF PHOSPHORUS: CPT

## 2020-09-01 PROCEDURE — 43611 EXCISION OF STOMACH LESION: CPT | Mod: ,,, | Performed by: SURGERY

## 2020-09-01 PROCEDURE — 88342 IMHCHEM/IMCYTCHM 1ST ANTB: CPT | Performed by: PATHOLOGY

## 2020-09-01 PROCEDURE — 25000242 PHARM REV CODE 250 ALT 637 W/ HCPCS: Performed by: NURSE PRACTITIONER

## 2020-09-01 PROCEDURE — 83735 ASSAY OF MAGNESIUM: CPT

## 2020-09-01 PROCEDURE — 94761 N-INVAS EAR/PLS OXIMETRY MLT: CPT

## 2020-09-01 PROCEDURE — 80048 BASIC METABOLIC PNL TOTAL CA: CPT

## 2020-09-01 PROCEDURE — 25000003 PHARM REV CODE 250: Performed by: STUDENT IN AN ORGANIZED HEALTH CARE EDUCATION/TRAINING PROGRAM

## 2020-09-01 PROCEDURE — 25000003 PHARM REV CODE 250: Performed by: SURGERY

## 2020-09-01 PROCEDURE — 63600175 PHARM REV CODE 636 W HCPCS: Performed by: SURGERY

## 2020-09-01 PROCEDURE — 88342 IMHCHEM/IMCYTCHM 1ST ANTB: CPT | Mod: 26,,, | Performed by: PATHOLOGY

## 2020-09-01 PROCEDURE — 88304 PR  SURG PATH,LEVEL III: ICD-10-PCS | Mod: 26,,, | Performed by: PATHOLOGY

## 2020-09-01 PROCEDURE — 36620 INSERTION CATHETER ARTERY: CPT | Mod: 59,,, | Performed by: ANESTHESIOLOGY

## 2020-09-01 PROCEDURE — 47562 LAPAROSCOPIC CHOLECYSTECTOMY: CPT | Mod: 51,,, | Performed by: SURGERY

## 2020-09-01 PROCEDURE — 94640 AIRWAY INHALATION TREATMENT: CPT

## 2020-09-01 PROCEDURE — 88341 IMHCHEM/IMCYTCHM EA ADD ANTB: CPT | Mod: 26,,, | Performed by: PATHOLOGY

## 2020-09-01 PROCEDURE — 71000033 HC RECOVERY, INTIAL HOUR: Performed by: SURGERY

## 2020-09-01 PROCEDURE — 86920 COMPATIBILITY TEST SPIN: CPT

## 2020-09-01 PROCEDURE — 85025 COMPLETE CBC W/AUTO DIFF WBC: CPT | Mod: 91

## 2020-09-01 PROCEDURE — 88309 TISSUE EXAM BY PATHOLOGIST: CPT | Mod: 26,,, | Performed by: PATHOLOGY

## 2020-09-01 PROCEDURE — 27201423 OPTIME MED/SURG SUP & DEVICES STERILE SUPPLY: Performed by: SURGERY

## 2020-09-01 PROCEDURE — 36620 ARTERIAL: ICD-10-PCS | Mod: 59,,, | Performed by: ANESTHESIOLOGY

## 2020-09-01 PROCEDURE — 88309 TISSUE EXAM BY PATHOLOGIST: CPT | Performed by: PATHOLOGY

## 2020-09-01 PROCEDURE — 37000008 HC ANESTHESIA 1ST 15 MINUTES: Performed by: SURGERY

## 2020-09-01 PROCEDURE — 47562 PR LAP,CHOLECYSTECTOMY: ICD-10-PCS | Mod: 51,,, | Performed by: SURGERY

## 2020-09-01 PROCEDURE — 36000712 HC OR TIME LEV V 1ST 15 MIN: Performed by: SURGERY

## 2020-09-01 PROCEDURE — 81314 PDGFRA GENE: CPT | Performed by: PATHOLOGY

## 2020-09-01 PROCEDURE — 81272 KIT GENE TARGETED SEQ ANALYS: CPT | Performed by: PATHOLOGY

## 2020-09-01 PROCEDURE — 80053 COMPREHEN METABOLIC PANEL: CPT

## 2020-09-01 PROCEDURE — 71000016 HC POSTOP RECOV ADDL HR: Performed by: SURGERY

## 2020-09-01 PROCEDURE — D9220A PRA ANESTHESIA: ICD-10-PCS | Mod: ,,, | Performed by: ANESTHESIOLOGY

## 2020-09-01 PROCEDURE — 36000713 HC OR TIME LEV V EA ADD 15 MIN: Performed by: SURGERY

## 2020-09-01 PROCEDURE — 88341 PR IHC OR ICC EACH ADD'L SINGLE ANTIBODY  STAINPR: ICD-10-PCS | Mod: 26,,, | Performed by: PATHOLOGY

## 2020-09-01 PROCEDURE — 86901 BLOOD TYPING SEROLOGIC RH(D): CPT

## 2020-09-01 PROCEDURE — 88309 PR  SURG PATH,LEVEL VI: ICD-10-PCS | Mod: 26,,, | Performed by: PATHOLOGY

## 2020-09-01 PROCEDURE — C1729 CATH, DRAINAGE: HCPCS | Performed by: SURGERY

## 2020-09-01 PROCEDURE — 27201037 HC PRESSURE MONITORING SET UP

## 2020-09-01 PROCEDURE — 94770 HC EXHALED C02 TEST: CPT

## 2020-09-01 PROCEDURE — 63600175 PHARM REV CODE 636 W HCPCS

## 2020-09-01 PROCEDURE — 88304 TISSUE EXAM BY PATHOLOGIST: CPT | Performed by: PATHOLOGY

## 2020-09-01 PROCEDURE — D9220A PRA ANESTHESIA: Mod: ,,, | Performed by: ANESTHESIOLOGY

## 2020-09-01 RX ORDER — LIDOCAINE HCL/PF 100 MG/5ML
SYRINGE (ML) INTRAVENOUS
Status: DISCONTINUED | OUTPATIENT
Start: 2020-09-01 | End: 2020-09-01

## 2020-09-01 RX ORDER — HYDROMORPHONE HCL IN 0.9% NACL 6 MG/30 ML
PATIENT CONTROLLED ANALGESIA SYRINGE INTRAVENOUS
Status: COMPLETED
Start: 2020-09-01 | End: 2020-09-01

## 2020-09-01 RX ORDER — FENTANYL CITRATE 50 UG/ML
INJECTION, SOLUTION INTRAMUSCULAR; INTRAVENOUS
Status: DISCONTINUED | OUTPATIENT
Start: 2020-09-01 | End: 2020-09-01

## 2020-09-01 RX ORDER — PHENYLEPHRINE HCL IN 0.9% NACL 1 MG/10 ML
SYRINGE (ML) INTRAVENOUS
Status: DISCONTINUED | OUTPATIENT
Start: 2020-09-01 | End: 2020-09-01

## 2020-09-01 RX ORDER — ENOXAPARIN SODIUM 100 MG/ML
40 INJECTION SUBCUTANEOUS EVERY 24 HOURS
Status: DISCONTINUED | OUTPATIENT
Start: 2020-09-01 | End: 2020-09-01

## 2020-09-01 RX ORDER — ONDANSETRON 2 MG/ML
INJECTION INTRAMUSCULAR; INTRAVENOUS
Status: DISCONTINUED | OUTPATIENT
Start: 2020-09-01 | End: 2020-09-01

## 2020-09-01 RX ORDER — BUPIVACAINE HYDROCHLORIDE 2.5 MG/ML
INJECTION, SOLUTION EPIDURAL; INFILTRATION; INTRACAUDAL
Status: DISCONTINUED | OUTPATIENT
Start: 2020-09-01 | End: 2020-09-01 | Stop reason: HOSPADM

## 2020-09-01 RX ORDER — DEXAMETHASONE SODIUM PHOSPHATE 4 MG/ML
INJECTION, SOLUTION INTRA-ARTICULAR; INTRALESIONAL; INTRAMUSCULAR; INTRAVENOUS; SOFT TISSUE
Status: DISCONTINUED | OUTPATIENT
Start: 2020-09-01 | End: 2020-09-01

## 2020-09-01 RX ORDER — SODIUM CHLORIDE 0.9 % (FLUSH) 0.9 %
10 SYRINGE (ML) INJECTION
Status: DISCONTINUED | OUTPATIENT
Start: 2020-09-01 | End: 2020-09-01

## 2020-09-01 RX ORDER — ONDANSETRON 8 MG/1
8 TABLET, ORALLY DISINTEGRATING ORAL EVERY 8 HOURS PRN
Status: DISCONTINUED | OUTPATIENT
Start: 2020-09-01 | End: 2020-09-02

## 2020-09-01 RX ORDER — ONDANSETRON 2 MG/ML
4 INJECTION INTRAMUSCULAR; INTRAVENOUS DAILY PRN
Status: DISCONTINUED | OUTPATIENT
Start: 2020-09-01 | End: 2020-09-01 | Stop reason: HOSPADM

## 2020-09-01 RX ORDER — KETAMINE HCL IN 0.9 % NACL 50 MG/5 ML
SYRINGE (ML) INTRAVENOUS
Status: DISCONTINUED | OUTPATIENT
Start: 2020-09-01 | End: 2020-09-01

## 2020-09-01 RX ORDER — EPHEDRINE SULFATE 50 MG/ML
INJECTION, SOLUTION INTRAVENOUS
Status: DISCONTINUED | OUTPATIENT
Start: 2020-09-01 | End: 2020-09-01

## 2020-09-01 RX ORDER — ROCURONIUM BROMIDE 10 MG/ML
INJECTION, SOLUTION INTRAVENOUS
Status: DISCONTINUED | OUTPATIENT
Start: 2020-09-01 | End: 2020-09-01

## 2020-09-01 RX ORDER — TALC
6 POWDER (GRAM) TOPICAL NIGHTLY PRN
Status: DISCONTINUED | OUTPATIENT
Start: 2020-09-01 | End: 2020-09-04 | Stop reason: HOSPADM

## 2020-09-01 RX ORDER — SODIUM CHLORIDE, SODIUM LACTATE, POTASSIUM CHLORIDE, CALCIUM CHLORIDE 600; 310; 30; 20 MG/100ML; MG/100ML; MG/100ML; MG/100ML
INJECTION, SOLUTION INTRAVENOUS CONTINUOUS
Status: DISCONTINUED | OUTPATIENT
Start: 2020-09-01 | End: 2020-09-02

## 2020-09-01 RX ORDER — SODIUM CHLORIDE 9 MG/ML
INJECTION, SOLUTION INTRAVENOUS CONTINUOUS
Status: DISCONTINUED | OUTPATIENT
Start: 2020-09-01 | End: 2020-09-01

## 2020-09-01 RX ORDER — ACETAMINOPHEN 10 MG/ML
1000 INJECTION, SOLUTION INTRAVENOUS EVERY 8 HOURS
Status: COMPLETED | OUTPATIENT
Start: 2020-09-01 | End: 2020-09-02

## 2020-09-01 RX ORDER — FENTANYL CITRATE 50 UG/ML
25 INJECTION, SOLUTION INTRAMUSCULAR; INTRAVENOUS EVERY 5 MIN PRN
Status: DISCONTINUED | OUTPATIENT
Start: 2020-09-01 | End: 2020-09-01 | Stop reason: HOSPADM

## 2020-09-01 RX ORDER — MIDAZOLAM HYDROCHLORIDE 1 MG/ML
INJECTION, SOLUTION INTRAMUSCULAR; INTRAVENOUS
Status: DISCONTINUED | OUTPATIENT
Start: 2020-09-01 | End: 2020-09-01

## 2020-09-01 RX ORDER — HYDROMORPHONE HCL IN 0.9% NACL 6 MG/30 ML
PATIENT CONTROLLED ANALGESIA SYRINGE INTRAVENOUS CONTINUOUS
Status: DISCONTINUED | OUTPATIENT
Start: 2020-09-01 | End: 2020-09-03

## 2020-09-01 RX ORDER — LEVALBUTEROL INHALATION SOLUTION 0.63 MG/3ML
0.63 SOLUTION RESPIRATORY (INHALATION)
Status: DISPENSED | OUTPATIENT
Start: 2020-09-01 | End: 2020-09-03

## 2020-09-01 RX ORDER — HYDROMORPHONE HYDROCHLORIDE 1 MG/ML
0.5 INJECTION, SOLUTION INTRAMUSCULAR; INTRAVENOUS; SUBCUTANEOUS
Status: DISCONTINUED | OUTPATIENT
Start: 2020-09-01 | End: 2020-09-01 | Stop reason: HOSPADM

## 2020-09-01 RX ORDER — METRONIDAZOLE 500 MG/100ML
500 INJECTION, SOLUTION INTRAVENOUS
Status: DISCONTINUED | OUTPATIENT
Start: 2020-09-01 | End: 2020-09-01

## 2020-09-01 RX ORDER — NALOXONE HCL 0.4 MG/ML
0.02 VIAL (ML) INJECTION
Status: DISCONTINUED | OUTPATIENT
Start: 2020-09-01 | End: 2020-09-03

## 2020-09-01 RX ORDER — LIDOCAINE HYDROCHLORIDE 10 MG/ML
1 INJECTION, SOLUTION EPIDURAL; INFILTRATION; INTRACAUDAL; PERINEURAL ONCE
Status: COMPLETED | OUTPATIENT
Start: 2020-09-01 | End: 2020-09-01

## 2020-09-01 RX ORDER — SODIUM CHLORIDE 0.9 % (FLUSH) 0.9 %
10 SYRINGE (ML) INJECTION
Status: DISCONTINUED | OUTPATIENT
Start: 2020-09-01 | End: 2020-09-04 | Stop reason: HOSPADM

## 2020-09-01 RX ORDER — ACETAMINOPHEN 10 MG/ML
INJECTION, SOLUTION INTRAVENOUS
Status: DISCONTINUED | OUTPATIENT
Start: 2020-09-01 | End: 2020-09-01

## 2020-09-01 RX ORDER — PROPOFOL 10 MG/ML
VIAL (ML) INTRAVENOUS
Status: DISCONTINUED | OUTPATIENT
Start: 2020-09-01 | End: 2020-09-01

## 2020-09-01 RX ADMIN — Medication 100 MCG: at 01:09

## 2020-09-01 RX ADMIN — Medication 20 MG: at 08:09

## 2020-09-01 RX ADMIN — ROCURONIUM BROMIDE 30 MG: 10 INJECTION, SOLUTION INTRAVENOUS at 09:09

## 2020-09-01 RX ADMIN — SODIUM CHLORIDE, SODIUM LACTATE, POTASSIUM CHLORIDE, AND CALCIUM CHLORIDE: .6; .31; .03; .02 INJECTION, SOLUTION INTRAVENOUS at 08:09

## 2020-09-01 RX ADMIN — SODIUM CHLORIDE, SODIUM GLUCONATE, SODIUM ACETATE, POTASSIUM CHLORIDE, MAGNESIUM CHLORIDE, SODIUM PHOSPHATE, DIBASIC, AND POTASSIUM PHOSPHATE: .53; .5; .37; .037; .03; .012; .00082 INJECTION, SOLUTION INTRAVENOUS at 07:09

## 2020-09-01 RX ADMIN — CEFTRIAXONE SODIUM 2 G: 2 INJECTION, SOLUTION INTRAVENOUS at 07:09

## 2020-09-01 RX ADMIN — Medication 200 MCG: at 08:09

## 2020-09-01 RX ADMIN — ACETAMINOPHEN 1000 MG: 10 INJECTION, SOLUTION INTRAVENOUS at 08:09

## 2020-09-01 RX ADMIN — ONDANSETRON 8 MG: 8 TABLET, ORALLY DISINTEGRATING ORAL at 10:09

## 2020-09-01 RX ADMIN — ROCURONIUM BROMIDE 10 MG: 10 INJECTION, SOLUTION INTRAVENOUS at 11:09

## 2020-09-01 RX ADMIN — Medication: at 02:09

## 2020-09-01 RX ADMIN — FENTANYL CITRATE 50 MCG: 50 INJECTION INTRAMUSCULAR; INTRAVENOUS at 07:09

## 2020-09-01 RX ADMIN — ROCURONIUM BROMIDE 30 MG: 10 INJECTION, SOLUTION INTRAVENOUS at 07:09

## 2020-09-01 RX ADMIN — Medication 10 MG: at 09:09

## 2020-09-01 RX ADMIN — ACETAMINOPHEN 1000 MG: 10 INJECTION, SOLUTION INTRAVENOUS at 09:09

## 2020-09-01 RX ADMIN — LIDOCAINE HYDROCHLORIDE 80 MG: 20 INJECTION, SOLUTION INTRAVENOUS at 07:09

## 2020-09-01 RX ADMIN — LIDOCAINE HYDROCHLORIDE 10 MG: 10 INJECTION, SOLUTION EPIDURAL; INFILTRATION; INTRACAUDAL; PERINEURAL at 06:09

## 2020-09-01 RX ADMIN — FENTANYL CITRATE 25 MCG: 50 INJECTION INTRAMUSCULAR; INTRAVENOUS at 01:09

## 2020-09-01 RX ADMIN — ROCURONIUM BROMIDE 20 MG: 10 INJECTION, SOLUTION INTRAVENOUS at 08:09

## 2020-09-01 RX ADMIN — SODIUM CHLORIDE: 0.9 INJECTION, SOLUTION INTRAVENOUS at 06:09

## 2020-09-01 RX ADMIN — ROCURONIUM BROMIDE 10 MG: 10 INJECTION, SOLUTION INTRAVENOUS at 10:09

## 2020-09-01 RX ADMIN — EPHEDRINE SULFATE 10 MG: 50 INJECTION INTRAVENOUS at 11:09

## 2020-09-01 RX ADMIN — ONDANSETRON 4 MG: 2 INJECTION INTRAMUSCULAR; INTRAVENOUS at 05:09

## 2020-09-01 RX ADMIN — ENOXAPARIN SODIUM 40 MG: 100 INJECTION SUBCUTANEOUS at 06:09

## 2020-09-01 RX ADMIN — EPHEDRINE SULFATE 10 MG: 50 INJECTION INTRAVENOUS at 10:09

## 2020-09-01 RX ADMIN — Medication 100 MCG: at 11:09

## 2020-09-01 RX ADMIN — SODIUM CHLORIDE, SODIUM LACTATE, POTASSIUM CHLORIDE, AND CALCIUM CHLORIDE: .6; .31; .03; .02 INJECTION, SOLUTION INTRAVENOUS at 02:09

## 2020-09-01 RX ADMIN — ROCURONIUM BROMIDE 20 MG: 10 INJECTION, SOLUTION INTRAVENOUS at 12:09

## 2020-09-01 RX ADMIN — SODIUM CHLORIDE, SODIUM GLUCONATE, SODIUM ACETATE, POTASSIUM CHLORIDE, MAGNESIUM CHLORIDE, SODIUM PHOSPHATE, DIBASIC, AND POTASSIUM PHOSPHATE: .53; .5; .37; .037; .03; .012; .00082 INJECTION, SOLUTION INTRAVENOUS at 09:09

## 2020-09-01 RX ADMIN — SODIUM CHLORIDE, SODIUM GLUCONATE, SODIUM ACETATE, POTASSIUM CHLORIDE, MAGNESIUM CHLORIDE, SODIUM PHOSPHATE, DIBASIC, AND POTASSIUM PHOSPHATE: .53; .5; .37; .037; .03; .012; .00082 INJECTION, SOLUTION INTRAVENOUS at 08:09

## 2020-09-01 RX ADMIN — DEXAMETHASONE SODIUM PHOSPHATE 4 MG: 4 INJECTION, SOLUTION INTRAMUSCULAR; INTRAVENOUS at 07:09

## 2020-09-01 RX ADMIN — Medication 10 MG: at 01:09

## 2020-09-01 RX ADMIN — ONDANSETRON 4 MG: 2 INJECTION INTRAMUSCULAR; INTRAVENOUS at 01:09

## 2020-09-01 RX ADMIN — Medication 10 MG: at 11:09

## 2020-09-01 RX ADMIN — MELATONIN TAB 3 MG 6 MG: 3 TAB at 08:09

## 2020-09-01 RX ADMIN — PROPOFOL 150 MG: 10 INJECTION, EMULSION INTRAVENOUS at 07:09

## 2020-09-01 RX ADMIN — LEVALBUTEROL HYDROCHLORIDE 0.63 MG: 0.63 SOLUTION RESPIRATORY (INHALATION) at 09:09

## 2020-09-01 RX ADMIN — MIDAZOLAM HYDROCHLORIDE 0.5 MG: 1 INJECTION, SOLUTION INTRAMUSCULAR; INTRAVENOUS at 06:09

## 2020-09-01 RX ADMIN — SODIUM CHLORIDE: 0.9 INJECTION, SOLUTION INTRAVENOUS at 07:09

## 2020-09-01 RX ADMIN — EPHEDRINE SULFATE 5 MG: 50 INJECTION INTRAVENOUS at 09:09

## 2020-09-01 NOTE — BRIEF OP NOTE
Preop Dx: 1. Gastrointestinal stromal tumor of stomach,  2. Cholelithiasis and chronic cholecystitis  Postop Dx: same  Surgeon: Erica  Assistant: Lucho  Operative Procedure: 1. Resection of GIST tumor of stomach   2. Cholecystectomy  Brief Detail: Robotic, minimally invasive approach. Large (10 cm) GIST tumor of mid stomach. Segmental gastric resection with gastrogastrostomy performed. R0 resection. No metastatic disease. Cholecystectomy performed.   EBL: 30 ccs  CPT code:

## 2020-09-01 NOTE — OP NOTE
Date of Operation: 09/01/2020  Preop Dx: 1. Gastrointestinal stromal tumor of stomach,  2. Cholelithiasis and chronic cholecystitis  Postop Dx: same  Surgeon: Erica  Assistant: Lucho  Operative Procedure: 1. Resection of GIST tumor of stomach   2. Cholecystectomy  Operative Detail:   The patient is placed in the supine position on the operating table.  Adequate general endotracheal anesthesia is induced.  The abdomen is widely prepped and draped in sterile fashion.  A small supraumbilical incision is made.  The fascia is controlled with a trach hook and a Veress needle was introduced without difficulty and intraperitoneal positioning is confirmed.  The abdomen is insufflated.  Using a 5 mm Optiview port a trocar is placed under direct vision in the supraumbilical midline.  We do have a free space.  Laparoscopy reveals a limited number of adhesions in the lower abdomen and pelvis but these are not problematic.  The peritoneal surfaces and omentum are unremarkable.  The liver is carefully inspected and is also unremarkable.  The gallbladder is thick-walled and chronically inflamed.  There is a large mass in the midportion of the stomach along the posterior aspect of the lesser curvature.  Under direct laparoscopic vision, 3 robotic working ports are placed and the initial 5 mm entry port is upsized to an 8 mm robotic port. Also under direct vision a 5 mm liver retractor port and a 12 mm assistant port are added.  The de Romi X Ay robot is rolled to the patient's bedside and docked and the instruments are inserted under direct vision and I moved to the console.  The tumor appears to be quite large, at least 10 cm in diameter.  It is broadly arising from the stomach in the midportion of the posterior wall along the lesser curve.  I began by mobilizing the lesser curve of the stomach using the vessel sealer.  Our dissection was kept right along the gastric wall preserving the nerves of ladders CLEO.  The greater  curvature of the stomach is also mobilized using the vessel sealer taking the gastrocolic omentum away from the gastric wall.  Given the large size of the tumor, the entire midportion of the stomach is circumferentially mobilized.  The blood supply to the antrum and to the fundus and cardia remains intact and I also believe we have been able to preserve the nerves of ladders day to the antrum and pylorus.  Given the bulky size of the tumor it does not appear optimal to do a wedge resection since closure of the stomach would then result in a great deal of deformity.  I elected to perform a segmental resection of the mid stomach.  This is accomplished using the robotic stapler green load 1st dividing the proximal antrum and then with a 2nd row of the stapler dividing the body of the stomach just below the fundus to complete our resection.  A Kocher maneuver was then performed in and we can easily approximate the proximal and distal segments of the stomach to gather.  Prior to doing the reconstruction, attention is turned to the gallbladder.  The fundus of the gallbladder is grasped and elevated exposing the ampulla and the triangle of colo.  Using the robotic hook cautery the triangle of YOLANDA was widely opened and the cystic artery is isolated.  This is taken between robotic clips.  The triangle of YOLANDA was widely opened and we have an excellent visualization of the critical angle.  The cystic duct is slightly dilated and I ligate the cystic duct x2 with 2 0 silk 1 cm away from the common duct.  A large clip is then applied across the neck of the gallbladder and the gallbladder is divided.  The gallbladder is temporarily left in the subhepatic space and I turned my attention back to the stomach.  A side-to-side gastro gastrostomy was created between the fundus of the stomach in the antrum using a robotic CLEO blue tissue cartridge.  The transverse enterotomies are then closed using running 3 0 V lock absorbable suture.   A technically satisfactory anastomosis is achieved.  Because it is impossible to know for certain that the nerves of ladders a have been preserved, I elected to perform a pyloromyotomy and this is performed in the usual fashion.  The operative field was then inspected.  Hemostasis is excellent.  Both the gallbladder and the segmental gastrectomy are placed in a large Endo-Catch bag.  Robotic instruments were then removed and the robot is undocked and moved away from the operating table.  Using a Barrett-Roland needle and 0 Vicryl the fascia at our left upper quadrant 12 mm port site through which the stapling was done is closed.  The specimen pouch is then extracted through our assistant port in the right lower quadrant.  This incision has to be modestly enlarged in order to a in accommodate the specimen.  The fascia at this site is then closed in 2 layers of running 0 Vicryl.  Marcaine solution is infiltrated at the port sites.  These skin incisions are closed using subcuticular technique.  The patient tolerates the procedure well and was brought to the recovery room in stable condition.  I confirmed on the back table that we do have a grossly negative margin both proximally and distally to the tumor.    EBL: 30 ccs

## 2020-09-01 NOTE — NURSING TRANSFER
Nursing Transfer Note      9/1/2020     Transfer To: 1024    Transfer via bed    Transfer with 2L O2    Transported by PCT x2    Medicines sent: IVF, PCA    Chart send with patient: Yes    Notified: daughter    Patient reassessed at: [id number]

## 2020-09-01 NOTE — ANESTHESIA PREPROCEDURE EVALUATION
[facility]  Anesthesia Pre-Operative Evaluation         Patient Name: Simi Encinas  YOB: 1940  MRN: [id number]    SUBJECTIVE:     Pre-operative evaluation for Procedure(s) (LRB):  XI ROBOTIC GASTRECTOMY (N/A)  CHOLECYSTECTOMY (N/A)  EGD (ESOPHAGOGASTRODUODENOSCOPY) (N/A)     08/31/2020    Simi Encinas is a 80 y.o. female w/ PMHx of Breast Cancer. Recent imaging that showed a 6-7 cm lobulated, exophytic mass arising from the posterior wall of the gastric body, suggesting a GIST tumor.     Patient now presents for the above procedure(s).      LDA: None documented.       Prev airway: None documented.    Drips: None documented.      Patient Active Problem List   Diagnosis    Cystocele, midline    Calculus of gallbladder with chronic cholecystitis    Gastric mass    Malignant gastrointestinal stromal tumor (GIST) of stomach       Review of patient's allergies indicates:   Allergen Reactions    Statins-hmg-coa reductase inhibitors Other (See Comments)       Current Inpatient Medications:      No current facility-administered medications on file prior to encounter.      Current Outpatient Medications on File Prior to Encounter   Medication Sig Dispense Refill    ascorbic acid, vitamin C, (VITAMIN C) 1000 MG tablet Take 1,000 mg by mouth once daily.       aspirin (ECOTRIN) 81 MG EC tablet Take 81 mg by mouth 2 (two) times daily.       busPIRone (BUSPAR) 15 MG tablet Take 15 mg by mouth. PT TAKING 1/3 OF PILL AS  NEEDED 5 MG      calcium citrate-vitamin D3 (CITRACAL + D MAXIMUM) 315 mg- 250 unit Tab Take by mouth once daily.       ciclopirox (PENLAC) 8 % Soln Apply topically nightly. Left great toe      denosumab (PROLIA) 60 mg/mL Syrg Inject 60 mg into the skin every 6 (six) months. PT GET INJECTION EVERY SIX MONTHS.last dose 5/2020      diclofenac sodium (VOLTAREN) 1 % Gel Apply 1 g topically 4 (four) times daily as needed (To on bilateal knees if needed.).       ezetimibe (ZETIA) 10 mg tablet Take 10 mg by mouth once daily. allergic to statins      famotidine (PEPCID) 20 MG tablet Take 20 mg by mouth.       multivitamin capsule Take 1 capsule by mouth once daily. PT TAKING 50+ WOMEN VIT      phenylephrine (SUDAFED PE) 10 MG Tab Take 10 mg by mouth every 4 (four) hours as needed. sinuses      polyethylene glycol (MIRALAX) 17 gram/dose powder Take 17 g by mouth once daily.       Saccharomyces boulardii (FLORASTOR) 250 mg capsule Take 100 mg by mouth once daily. Women's probiotics      suvorexant (BELSOMRA) 5 mg Tab Take 5 mg by mouth nightly as needed (insomina if needed).       turmeric 400 mg Cap Take 400 mg by mouth once daily.       vitamin D (VITAMIN D3) 1000 units Tab Take 2,000 Units by mouth once daily.          Past Surgical History:   Procedure Laterality Date    BREAST LUMPECTOMY      SUBTOTAL COLECTOMY         Social History     Socioeconomic History    Marital status:      Spouse name: Not on file    Number of children: Not on file    Years of education: Not on file    Highest education level: Not on file   Occupational History    Not on file   Social Needs    Financial resource strain: Not on file    Food insecurity     Worry: Not on file     Inability: Not on file    Transportation needs     Medical: Not on file     Non-medical: Not on file   Tobacco Use    Smoking status: Never Smoker    Smokeless tobacco: Never Used   Substance and Sexual Activity    Alcohol use: Yes     Alcohol/week: 0.0 standard drinks     Comment: occasionally    Drug use: No    Sexual activity: Not on file   Lifestyle    Physical activity     Days per week: Not on file     Minutes per session: Not on file    Stress: Not on file   Relationships    Social connections     Talks on phone: Not on file     Gets together: Not on file     Attends Gnosticism service: Not on file     Active member of club or organization: Not on file     Attends meetings of clubs or  organizations: Not on file     Relationship status: Not on file   Other Topics Concern    Not on file   Social History Narrative    Not on file       OBJECTIVE:     Vital Signs Range (Last 24H):         Significant Labs:  Lab Results   Component Value Date    WBC 11.89 (H) 06/14/2008    HGB 12.0 06/14/2008    HCT 37.2 06/14/2008     06/14/2008     06/14/2008    K 3.8 06/14/2008     06/14/2008    CREATININE 0.8 06/14/2008    BUN 8 06/14/2008    CO2 22 (L) 06/14/2008       Diagnostic Studies: No relevant studies.    EKG:   Results for orders placed or performed in visit on 08/26/20   IN OFFICE EKG 12-LEAD (to Referly)    Collection Time: 08/26/20 11:26 AM    Narrative    Test Reason : Z01.810,    Vent. Rate : 082 BPM     Atrial Rate : 082 BPM     P-R Int : 166 ms          QRS Dur : 088 ms      QT Int : 350 ms       P-R-T Axes : 071 009 028 degrees     QTc Int : 408 ms    Normal sinus rhythm  Normal ECG  No previous ECGs available  Confirmed by AKI SCHULTE MD ([id number]) on 8/26/2020 3:54:58 PM    Referred By: LILLIE DOTSON           Confirmed By:AKI SCHULTE MD         2D ECHO:  No results found for this or any previous visit.      ASSESSMENT/PLAN:       Anesthesia Evaluation    I have reviewed the Patient Summary Reports.    I have reviewed the NPO Status.      Review of Systems  Anesthesia Hx:  Denies Hx of Anesthetic complications  History of prior surgery of interest to airway management or planning: Denies Family Hx of Anesthesia complications.   Denies Personal Hx of Anesthesia complications.   Social:  Non-Smoker    Hematology/Oncology:         -- Cancer in past history:   Cardiovascular:  Cardiovascular Normal     Pulmonary:  Pulmonary Normal  Denies Shortness of breath.    Neurological:  Neurology Normal    Endocrine:  Endocrine Normal    Psych:  Psychiatric Normal           Physical Exam  General:  Well nourished    Airway/Jaw/Neck:  Airway Findings: Mouth Opening: Normal Tongue: Normal  General Airway  Assessment: Adult  Mallampati: II  TM Distance: Normal, at least 6 cm  Jaw/Neck Findings:  Neck ROM: Normal ROM      Dental:  Dental Findings: In tact   Chest/Lungs:  Chest/Lungs Findings: Clear to auscultation     Heart/Vascular:  Heart Findings: Rate: Normal  Rhythm: Regular Rhythm     Abdomen:  Abdomen Findings:  Normal       Mental Status:  Mental Status Findings:  Cooperative, Alert and Oriented         Anesthesia Plan  Type of Anesthesia, risks & benefits discussed:  Anesthesia Type:  general  Patient's Preference:   Intra-op Monitoring Plan: standard ASA monitors  Intra-op Monitoring Plan Comments:   Post Op Pain Control Plan: multimodal analgesia  Post Op Pain Control Plan Comments:   Induction:   IV  Beta Blocker:  Patient is not currently on a Beta-Blocker (No further documentation required).       Informed Consent: Patient understands risks and agrees with Anesthesia plan.  Questions answered. Anesthesia consent signed with patient.  ASA Score: 3     Day of Surgery Review of History & Physical:    H&P update referred to the surgeon.         Ready For Surgery From Anesthesia Perspective.

## 2020-09-01 NOTE — ANESTHESIA PROCEDURE NOTES
Arterial    Diagnosis: Gastric carcinoma    Patient location during procedure: done in OR  Procedure start time: 9/1/2020 7:50 AM  Timeout: 9/1/2020 7:50 AM  Procedure end time: 9/1/2020 7:55 AM    Staffing  Authorizing Provider: Pascual Rodríguez MD  Performing Provider: Ranulfo Olivas MD    Anesthesiologist was present at the time of the procedure.    Preanesthetic Checklist  Completed: patient identified, site marked, surgical consent, pre-op evaluation, timeout performed, IV checked, risks and benefits discussed, monitors and equipment checked and anesthesia consent givenArterial  Skin Prep: chlorhexidine gluconate  Orientation: left  Location: radial  Catheter placement by Anatomical landmarks. Heme positive aspiration all ports.Insertion Attempts: 2  Assessment  Dressing: secured with tape and tegaderm  Patient: Tolerated well

## 2020-09-01 NOTE — ANESTHESIA PROCEDURE NOTES
Intubation  Performed by: Ranulfo Olivas MD  Authorized by: Pascual Rodríguez MD     Intubation:     Induction:  Intravenous    Intubated:  Postinduction    Mask Ventilation:  Easy mask    Attempts:  1    Attempted By:  Resident anesthesiologist    Method of Intubation:  Direct    Blade:  Thomas 2    Laryngeal View Grade: Grade I - full view of chords      Difficult Airway Encountered?: No      Complications:  None    Airway Device:  Oral endotracheal tube    Airway Device Size:  7.5    Style/Cuff Inflation:  Cuffed    Secured at:  The lips    Placement Verified By:  Capnometry    Complicating Factors:  None    Findings Post-Intubation:  BS equal bilateral

## 2020-09-01 NOTE — H&P
SUBJECTIVE:      History of Present Illness:  Patient is a 80 y.o. female presents with a gastric mass found on imaging. She was initially evaluated in the ED at [place] in July for abdominal pain and nausea and got a RUQUS which demonstrated gallstones along with a mass which on follow-up CT was originating from the lesser curve of the stomach. She states she has had 3 episodes of acute epigastric pain with radiation to her back over the last 2 months. Each episode lasts about 5 hours. Otherwise eating well, denies diarrhea/constipation/melena/hematochezia. Lost 25lbs over last 6 months on account of dieting. Denies fevers/chills. Walks with a cane. Had a similar episode of pain about 1.5 years ago and was attributed to large gallstone in neck of gallbladder.      8/26/20 clinic follow-up after EGD with biopsy for preoperative planning. Feeling well today, no current abdominal pain, no nausea/vomiting, normal BMs, denies fevers/chills/cough/dyspnea.           Chief Complaint   Patient presents with    Consult                 Review of patient's allergies indicates:   Allergen Reactions    Statins-hmg-coa reductase inhibitors Other (See Comments)         Current Medications               Current Outpatient Medications   Medication Sig Dispense Refill    ascorbic acid, vitamin C, (VITAMIN C) 1000 MG tablet          calcium citrate-vitamin D3 (CITRACAL + D MAXIMUM) 315 mg- 250 unit Tab          ciclopirox (PENLAC) 8 % Soln          diclofenac (VOLTAREN) 0.1 % ophthalmic solution 1 drop 4 (four) times daily.        ezetimibe (ZETIA) 10 mg tablet          famotidine (PEPCID) 20 MG tablet          multivitamin capsule Take 1 capsule by mouth once daily. PT TAKING 50+ WOMEN VIT        phentermine (ADIPEX-P) 37.5 mg tablet          phenylephrine (SUDAFED PE) 10 MG Tab          polyethylene glycol (MIRALAX) 17 gram/dose powder          Saccharomyces boulardii (FLORASTOR) 250 mg capsule Take 50 mg by  mouth once daily.        suvorexant (BELSOMRA) 5 mg Tab Take 5 mg by mouth.        turmeric 400 mg Cap          vitamin D (VITAMIN D3) 1000 units Tab Take 2,000 Units by mouth.        aspirin (ECOTRIN) 81 MG EC tablet 81 mg.        busPIRone (BUSPAR) 15 MG tablet Take 15 mg by mouth. PT TAKING 1/3 OF PILL AS  NEEDED.L 5 MG        denosumab (PROLIA) 60 mg/mL Syrg PT GET INJECTION EVERY SIX MONTHS.          No current facility-administered medications for this visit.                     Past Medical History:   Diagnosis Date    Breast CA      Hypercholesteremia                  Past Surgical History:   Procedure Laterality Date    BREAST LUMPECTOMY        SUBTOTAL COLECTOMY                    Family History   Problem Relation Age of Onset    Colon cancer Father      Hypertension Other        Social History                Tobacco Use    Smoking status: Never Smoker    Smokeless tobacco: Never Used   Substance Use Topics    Alcohol use: Yes       Alcohol/week: 0.0 standard drinks       Comment: occasionally    Drug use: No         Review of Systems:  Review of Systems   Constitutional: Negative for activity change, appetite change, chills, diaphoresis, fatigue, fever and unexpected weight change.   HENT: Negative for congestion, nosebleeds and trouble swallowing.    Eyes: Negative for visual disturbance.   Respiratory: Negative for cough, chest tightness and shortness of breath.    Cardiovascular: Negative for chest pain, palpitations and leg swelling.   Gastrointestinal: Positive for abdominal pain and nausea. Negative for abdominal distention, constipation, diarrhea and vomiting.   Genitourinary: Negative for decreased urine volume, difficulty urinating and dysuria   Musculoskeletal: Negative for gait problem, joint swelling and myalgias.   Skin: Negative for color change, pallor and rash.   Neurological: Negative for dizziness, numbness and headaches.   Psychiatric/Behavioral: Negative  for agitation and confusion.         OBJECTIVE:      Vital Signs (Most Recent)  There were no vitals filed for this visit.       Physical Exam  Constitutional:       General: She is not in acute distress.     Appearance: Normal appearance. She is not ill-appearing, toxic-appearing or diaphoretic.   HENT:      Head: Normocephalic and atraumatic.      Nose: Nose normal.      Mouth/Throat:      Mouth: Mucous membranes are moist.      Pharynx: Oropharynx is clear.   Eyes:      Extraocular Movements: Extraocular movements intact.      Conjunctiva/sclera: Conjunctivae normal.      Pupils: Pupils are equal, round, and reactive to light.   Neck:      Musculoskeletal: Normal range of motion and neck supple.   Cardiovascular:      Rate and Rhythm: Normal rate and regular rhythm.      Pulses: Normal pulses.   Pulmonary:      Effort: Pulmonary effort is normal.      Breath sounds: Normal breath sounds.   Abdominal:      General: Abdomen is flat. There is no distension.      Palpations: Abdomen is soft.      Tenderness: There is abdominal tenderness (mild tenderness to deep palpation of epigastrium). There is no guarding or rebound.      Hernia: No hernia is present.   Musculoskeletal: Normal range of motion.   Skin:     General: Skin is warm and dry.      Capillary Refill: Capillary refill takes less than 2 seconds.   Neurological:      General: No focal deficit present.      Mental Status: She is alert and oriented to person, place, and time. Mental status is at baseline.   Psychiatric:         Mood and Affect: Mood normal.         Behavior: Behavior normal.         Thought Content: Thought content normal.         Laboratory  Reviewed labs in care everywhere from 8/3/20, WBC 10.5, Hgb 12.8, Plt 456, CMP wnl, Albumin 4.1     Diagnostic Results:  Reviewed CT and US which patient brought on disc today, 6.5cm mass exophytic from lesser curve of stomach and prominent lymph nodes     EGD/Biopsy from 8/21/20 consistent with gastric  GIST     ASSESSMENT/PLAN:      80 year-old female with 6.5cm exophytic gastric GIST from lesser curve. Also with what sounds like symptomatic cholelithiasis. We reviewed risks, benefits, and alternative to surgery and the patient consented for surgery. Will proceed with scheduling partial gastrectomy in addition to cholecystectomy at the same time for 9/1/20.     PLAN:     -Partial gastrectomy/wedge resection, EGD, and cholecystectomy today  -Preoperative labs ordered for morning of surgery, EKG today, COVID-19 ordered pre-op  -May continue taking current medications     We discussed the surgical plan with Ms Encinas and her daughter: partial gastrectomy and cholecystectomy using a robotic minimally-invasive approach. She understands that open conversion may be needed. She is also aware that she will need to see Dr Tomlinson postop and will probably need adjuvant Gleevec therapy.

## 2020-09-01 NOTE — TRANSFER OF CARE
"Anesthesia Transfer of Care Note    Patient: Simi Encinas    Procedure(s) Performed: Procedure(s) (LRB):  XI ROBOTIC GASTRECTOMY (N/A)  CHOLECYSTECTOMY (N/A)    Patient location: PACU    Anesthesia Type: general    Transport from OR: Transported from OR on 2-3 L/min O2 by NC with adequate spontaneous ventilation    Post pain: adequate analgesia    Post assessment: no apparent anesthetic complications    Post vital signs: stable    Level of consciousness: awake and alert    Nausea/Vomiting: no nausea/vomiting    Complications: none    Transfer of care protocol was followed      Last vitals:   Visit Vitals  BP (!) 121/58 (BP Location: Right arm, Patient Position: Lying)   Pulse 75   Temp 36.6 °C (97.9 °F) (Temporal)   Resp 17   Ht 5' 3" (1.6 m)   Wt 64 kg (141 lb)   SpO2 97%   Breastfeeding No   BMI 24.98 kg/m²     "

## 2020-09-02 LAB
ANION GAP SERPL CALC-SCNC: 11 MMOL/L (ref 8–16)
BASOPHILS # BLD AUTO: 0.01 K/UL (ref 0–0.2)
BASOPHILS NFR BLD: 0.1 % (ref 0–1.9)
BUN SERPL-MCNC: 7 MG/DL (ref 8–23)
CALCIUM SERPL-MCNC: 7.5 MG/DL (ref 8.7–10.5)
CHLORIDE SERPL-SCNC: 106 MMOL/L (ref 95–110)
CO2 SERPL-SCNC: 21 MMOL/L (ref 23–29)
CREAT SERPL-MCNC: 0.6 MG/DL (ref 0.5–1.4)
DIFFERENTIAL METHOD: ABNORMAL
EOSINOPHIL # BLD AUTO: 0 K/UL (ref 0–0.5)
EOSINOPHIL NFR BLD: 0.1 % (ref 0–8)
ERYTHROCYTE [DISTWIDTH] IN BLOOD BY AUTOMATED COUNT: 13.6 % (ref 11.5–14.5)
EST. GFR  (AFRICAN AMERICAN): >60 ML/MIN/1.73 M^2
EST. GFR  (NON AFRICAN AMERICAN): >60 ML/MIN/1.73 M^2
GLUCOSE SERPL-MCNC: 108 MG/DL (ref 70–110)
HCT VFR BLD AUTO: 28.9 % (ref 37–48.5)
HGB BLD-MCNC: 8.9 G/DL (ref 12–16)
IMM GRANULOCYTES # BLD AUTO: 0.09 K/UL (ref 0–0.04)
IMM GRANULOCYTES NFR BLD AUTO: 0.8 % (ref 0–0.5)
LYMPHOCYTES # BLD AUTO: 1.5 K/UL (ref 1–4.8)
LYMPHOCYTES NFR BLD: 13.2 % (ref 18–48)
MAGNESIUM SERPL-MCNC: 1.4 MG/DL (ref 1.6–2.6)
MCH RBC QN AUTO: 27.2 PG (ref 27–31)
MCHC RBC AUTO-ENTMCNC: 30.8 G/DL (ref 32–36)
MCV RBC AUTO: 88 FL (ref 82–98)
MONOCYTES # BLD AUTO: 0.8 K/UL (ref 0.3–1)
MONOCYTES NFR BLD: 7 % (ref 4–15)
NEUTROPHILS # BLD AUTO: 8.9 K/UL (ref 1.8–7.7)
NEUTROPHILS NFR BLD: 78.8 % (ref 38–73)
NRBC BLD-RTO: 0 /100 WBC
PHOSPHATE SERPL-MCNC: 1.9 MG/DL (ref 2.7–4.5)
PLATELET # BLD AUTO: 396 K/UL (ref 150–350)
PMV BLD AUTO: 11 FL (ref 9.2–12.9)
POTASSIUM SERPL-SCNC: 4.1 MMOL/L (ref 3.5–5.1)
RBC # BLD AUTO: 3.27 M/UL (ref 4–5.4)
SODIUM SERPL-SCNC: 138 MMOL/L (ref 136–145)
WBC # BLD AUTO: 11.33 K/UL (ref 3.9–12.7)

## 2020-09-02 PROCEDURE — C9113 INJ PANTOPRAZOLE SODIUM, VIA: HCPCS | Performed by: STUDENT IN AN ORGANIZED HEALTH CARE EDUCATION/TRAINING PROGRAM

## 2020-09-02 PROCEDURE — 97161 PT EVAL LOW COMPLEX 20 MIN: CPT

## 2020-09-02 PROCEDURE — 97165 OT EVAL LOW COMPLEX 30 MIN: CPT

## 2020-09-02 PROCEDURE — 97535 SELF CARE MNGMENT TRAINING: CPT

## 2020-09-02 PROCEDURE — 63600175 PHARM REV CODE 636 W HCPCS: Performed by: STUDENT IN AN ORGANIZED HEALTH CARE EDUCATION/TRAINING PROGRAM

## 2020-09-02 PROCEDURE — 94761 N-INVAS EAR/PLS OXIMETRY MLT: CPT

## 2020-09-02 PROCEDURE — 94640 AIRWAY INHALATION TREATMENT: CPT

## 2020-09-02 PROCEDURE — 80048 BASIC METABOLIC PNL TOTAL CA: CPT

## 2020-09-02 PROCEDURE — 97530 THERAPEUTIC ACTIVITIES: CPT

## 2020-09-02 PROCEDURE — 27000646 HC AEROBIKA DEVICE

## 2020-09-02 PROCEDURE — 94664 DEMO&/EVAL PT USE INHALER: CPT

## 2020-09-02 PROCEDURE — 84100 ASSAY OF PHOSPHORUS: CPT

## 2020-09-02 PROCEDURE — 36415 COLL VENOUS BLD VENIPUNCTURE: CPT

## 2020-09-02 PROCEDURE — 20600001 HC STEP DOWN PRIVATE ROOM

## 2020-09-02 PROCEDURE — 94799 UNLISTED PULMONARY SVC/PX: CPT

## 2020-09-02 PROCEDURE — 94770 HC EXHALED C02 TEST: CPT

## 2020-09-02 PROCEDURE — 25000003 PHARM REV CODE 250: Performed by: STUDENT IN AN ORGANIZED HEALTH CARE EDUCATION/TRAINING PROGRAM

## 2020-09-02 PROCEDURE — 83735 ASSAY OF MAGNESIUM: CPT

## 2020-09-02 PROCEDURE — 85025 COMPLETE CBC W/AUTO DIFF WBC: CPT

## 2020-09-02 PROCEDURE — 99900035 HC TECH TIME PER 15 MIN (STAT)

## 2020-09-02 PROCEDURE — 25000242 PHARM REV CODE 250 ALT 637 W/ HCPCS: Performed by: NURSE PRACTITIONER

## 2020-09-02 PROCEDURE — 25000003 PHARM REV CODE 250: Performed by: NURSE PRACTITIONER

## 2020-09-02 PROCEDURE — 63600175 PHARM REV CODE 636 W HCPCS: Performed by: NURSE PRACTITIONER

## 2020-09-02 RX ORDER — ONDANSETRON 4 MG/1
4 TABLET, ORALLY DISINTEGRATING ORAL EVERY 6 HOURS PRN
Status: DISCONTINUED | OUTPATIENT
Start: 2020-09-02 | End: 2020-09-04 | Stop reason: HOSPADM

## 2020-09-02 RX ORDER — DEXTROSE MONOHYDRATE, SODIUM CHLORIDE, AND POTASSIUM CHLORIDE 50; 1.49; 4.5 G/1000ML; G/1000ML; G/1000ML
INJECTION, SOLUTION INTRAVENOUS CONTINUOUS
Status: DISCONTINUED | OUTPATIENT
Start: 2020-09-02 | End: 2020-09-04

## 2020-09-02 RX ORDER — MAGNESIUM SULFATE HEPTAHYDRATE 40 MG/ML
2 INJECTION, SOLUTION INTRAVENOUS ONCE
Status: COMPLETED | OUTPATIENT
Start: 2020-09-02 | End: 2020-09-02

## 2020-09-02 RX ORDER — EZETIMIBE 10 MG/1
10 TABLET ORAL DAILY
Status: DISCONTINUED | OUTPATIENT
Start: 2020-09-03 | End: 2020-09-04 | Stop reason: HOSPADM

## 2020-09-02 RX ORDER — ENOXAPARIN SODIUM 100 MG/ML
40 INJECTION SUBCUTANEOUS EVERY 24 HOURS
Status: DISCONTINUED | OUTPATIENT
Start: 2020-09-02 | End: 2020-09-04

## 2020-09-02 RX ORDER — ACETAMINOPHEN 10 MG/ML
1000 INJECTION, SOLUTION INTRAVENOUS EVERY 8 HOURS
Status: DISPENSED | OUTPATIENT
Start: 2020-09-02 | End: 2020-09-03

## 2020-09-02 RX ORDER — PANTOPRAZOLE SODIUM 40 MG/10ML
40 INJECTION, POWDER, LYOPHILIZED, FOR SOLUTION INTRAVENOUS DAILY
Status: DISCONTINUED | OUTPATIENT
Start: 2020-09-02 | End: 2020-09-03

## 2020-09-02 RX ADMIN — ENOXAPARIN SODIUM 40 MG: 40 INJECTION SUBCUTANEOUS at 04:09

## 2020-09-02 RX ADMIN — POTASSIUM CHLORIDE, DEXTROSE MONOHYDRATE AND SODIUM CHLORIDE: 150; 5; 450 INJECTION, SOLUTION INTRAVENOUS at 08:09

## 2020-09-02 RX ADMIN — LEVALBUTEROL HYDROCHLORIDE 0.63 MG: 0.63 SOLUTION RESPIRATORY (INHALATION) at 09:09

## 2020-09-02 RX ADMIN — ACETAMINOPHEN 1000 MG: 10 INJECTION, SOLUTION INTRAVENOUS at 02:09

## 2020-09-02 RX ADMIN — LEVALBUTEROL HYDROCHLORIDE 0.63 MG: 0.63 SOLUTION RESPIRATORY (INHALATION) at 01:09

## 2020-09-02 RX ADMIN — SODIUM CHLORIDE, SODIUM LACTATE, POTASSIUM CHLORIDE, AND CALCIUM CHLORIDE: .6; .31; .03; .02 INJECTION, SOLUTION INTRAVENOUS at 05:09

## 2020-09-02 RX ADMIN — POTASSIUM CHLORIDE, DEXTROSE MONOHYDRATE AND SODIUM CHLORIDE: 150; 5; 450 INJECTION, SOLUTION INTRAVENOUS at 02:09

## 2020-09-02 RX ADMIN — LEVALBUTEROL HYDROCHLORIDE 0.63 MG: 0.63 SOLUTION RESPIRATORY (INHALATION) at 08:09

## 2020-09-02 RX ADMIN — ACETAMINOPHEN 1000 MG: 10 INJECTION, SOLUTION INTRAVENOUS at 11:09

## 2020-09-02 RX ADMIN — SODIUM PHOSPHATE, MONOBASIC, MONOHYDRATE 39.99 MMOL: 276; 142 INJECTION, SOLUTION INTRAVENOUS at 01:09

## 2020-09-02 RX ADMIN — MAGNESIUM SULFATE IN WATER 2 G: 40 INJECTION, SOLUTION INTRAVENOUS at 12:09

## 2020-09-02 RX ADMIN — MELATONIN TAB 3 MG 6 MG: 3 TAB at 08:09

## 2020-09-02 RX ADMIN — PANTOPRAZOLE SODIUM 40 MG: 40 INJECTION, POWDER, LYOPHILIZED, FOR SOLUTION INTRAVENOUS at 09:09

## 2020-09-02 RX ADMIN — ONDANSETRON 8 MG: 8 TABLET, ORALLY DISINTEGRATING ORAL at 05:09

## 2020-09-02 RX ADMIN — ACETAMINOPHEN 1000 MG: 10 INJECTION, SOLUTION INTRAVENOUS at 05:09

## 2020-09-02 NOTE — NURSING
Pt resting quietly in bed with eyes closed. Resp unlabored. NC 02 2L remains in place with etco2 monitor present. PCA pump with accurate continued delivery. NAD noted. Easily arousable to verbal stimuli. Offers no complaints at present. Awaiting MD orders/disposition. Bed rails up x 2 with bed locked in lowest position. Call light in reach. Will continue to monitor. ADMIT evaluation continues.

## 2020-09-02 NOTE — PROGRESS NOTES
[facility]  General Surgery  Progress Note    Subjective:       Post-Op Info:  Procedure(s) (LRB):  XI ROBOTIC GASTRECTOMY (N/A)  CHOLECYSTECTOMY (N/A)   1 Day Post-Op     Interval History: CODIE ODOMFSS. Minor nausea overnight, did not ask for PRN meds. Has not yet ambulated. Is OK to have sips of clears today, will take it slow.     Medications:  Continuous Infusions:   hydromorphone in 0.9 % NaCl 6 mg/30 ml      lactated ringers 100 mL/hr at 09/02/20 0502     Scheduled Meds:   acetaminophen  1,000 mg Intravenous Q8H    levalbuterol  0.63 mg Nebulization Q6H WAKE    pantoprazole  40 mg Intravenous Daily     PRN Meds:melatonin, naloxone, ondansetron, sodium chloride 0.9%     Review of patient's allergies indicates:   Allergen Reactions    Statins-hmg-coa reductase inhibitors Other (See Comments)     Objective:     Vital Signs (Most Recent):  Temp: 97.9 °F (36.6 °C) (09/02/20 0727)  Pulse: 72 (09/02/20 0840)  Resp: 14 (09/02/20 0840)  BP: 131/62 (09/02/20 0727)  SpO2: 96 % (09/02/20 0840) Vital Signs (24h Range):  Temp:  [97.8 °F (36.6 °C)-98.2 °F (36.8 °C)] 97.9 °F (36.6 °C)  Pulse:  [72-89] 72  Resp:  [10-20] 14  SpO2:  [93 %-100 %] 96 %  BP: (117-142)/(56-83) 131/62     Weight: 64 kg (141 lb)  Body mass index is 24.98 kg/m².    Intake/Output - Last 3 Shifts       08/31 0700 - 09/01 0659 09/01 0700 - 09/02 0659 09/02 0700 - 09/03 0659    I.V. (mL/kg)  3221 (50.3)     Total Intake(mL/kg)  3221 (50.3)     Urine (mL/kg/hr)  4150 (2.7)     Stool  0     Total Output  4150     Net  -929            Stool Occurrence  0 x           Physical Exam  Constitutional:       Appearance: Normal appearance. She is normal weight.   HENT:      Head: Normocephalic and atraumatic.      Mouth/Throat:      Mouth: Mucous membranes are moist.   Cardiovascular:      Rate and Rhythm: Normal rate.   Pulmonary:      Effort: Pulmonary effort is normal. No respiratory distress.   Abdominal:      General: Abdomen is flat.  There is no distension.      Palpations: Abdomen is soft.      Tenderness: There is abdominal tenderness.      Comments: Minor tenderness. Incision clean, dry, intact.    Skin:     General: Skin is warm and dry.   Neurological:      General: No focal deficit present.      Mental Status: She is alert and oriented to person, place, and time.   Psychiatric:         Mood and Affect: Mood normal.         Behavior: Behavior normal.         Significant Labs:  CBC:   Recent Labs   Lab 09/02/20 0317   WBC 11.33   RBC 3.27*   HGB 8.9*   HCT 28.9*   *   MCV 88   MCH 27.2   MCHC 30.8*     BMP:   Recent Labs   Lab 09/02/20 0317         K 4.1      CO2 21*   BUN 7*   CREATININE 0.6   CALCIUM 7.5*   MG 1.4*     CMP:   Recent Labs   Lab 09/01/20  0545  09/02/20 0317      < > 108   CALCIUM 9.5   < > 7.5*   ALBUMIN 3.0*  --   --    PROT 7.0  --   --       < > 138   K 4.2   < > 4.1   CO2 27   < > 21*      < > 106   BUN 13   < > 7*   CREATININE 0.8   < > 0.6   ALKPHOS 113  --   --    ALT 24  --   --    AST 29  --   --    BILITOT 0.3  --   --     < > = values in this interval not displayed.       Significant Diagnostics:  I have reviewed all pertinent imaging results/findings within the past 24 hours.    Assessment/Plan:     * Malignant gastrointestinal stromal tumor (GIST) of stomach  80y F w/ hx of GIST of stomach now s/p robotic gastrectomy.     - Diet: NPO with sips of clears  - PRN Nausea and pain control   - PCA  - IVF  - d/c sanchez today   - Encourage ambulation, OOBTC  - PT &OT  - DVT ppx, encourage IS, aaron Potts MD  General Surgery  [facility]

## 2020-09-02 NOTE — NURSING
Pt sitting up in bed with television on. Resp unlabored. NAD noted. Easily arousable to verbal stimuli. Offers no complaints at present. Awaiting MD orders/ disposition. Bed rails up x 2 with bed locked in lowest position. Call light in reach. Will continue to monitor. ADMIT evaluation continues.

## 2020-09-02 NOTE — PLAN OF CARE
Problem: Physical Therapy Goal  Goal: Physical Therapy Goal  Description: Goals to be met by: 2020     Patient will increase functional independence with mobility by performin. Supine to sit with Modified [place]  2. Sit to supine with Modified [place]  3. Sit to stand transfer with Supervision  4. Bed to chair transfer with Supervision   5. Gait  x 150 feet with Supervision  6. Ascend/descend 2 stair with Supervision     Outcome: Ongoing, Progressing   Eval completed and POC established    Christophe Veloz PT,DPT  2020

## 2020-09-02 NOTE — PLAN OF CARE
Pt. needs met. VSS throughout shift.IVF as ordered IV abx as ordered. Pt up to chair for several hours.  Pt presented with education, needs reinfocrcement. POC reviewed will continue to monitor and adjust POC.   Problem: Adult Inpatient Plan of Care  Goal: Plan of Care Review  Outcome: Ongoing, Progressing  Goal: Patient-Specific Goal (Individualization)  Outcome: Ongoing, Progressing  Goal: Absence of Hospital-Acquired Illness or Injury  Outcome: Ongoing, Progressing  Goal: Optimal Comfort and Wellbeing  Outcome: Ongoing, Progressing  Goal: Readiness for Transition of Care  Outcome: Ongoing, Progressing  Goal: Rounds/Family Conference  Outcome: Ongoing, Progressing     Problem: Fall Injury Risk  Goal: Absence of Fall and Fall-Related Injury  Outcome: Ongoing, Progressing     Problem: Infection  Goal: Infection Symptom Resolution  Outcome: Ongoing, Progressing

## 2020-09-02 NOTE — ASSESSMENT & PLAN NOTE
80y F w/ hx of GIST of stomach now s/p robotic gastrectomy.     - Diet: NPO with sips of clears  - PRN Nausea and pain control   - PCA  - IVF  - d/c daniel today   - Encourage ambulation, OOBTC  - PT &OT  - DVT ppx, encourage IS, acapella

## 2020-09-02 NOTE — PLAN OF CARE
Pt sitting up in bed and requesting information on plan of care for discharge. Pt reports optimal comfort and relief while utilizing PCA pump. Pt verbalizes understanding to utilize call bell in order to alert staff to needs and help prevent fall this shift.             Problem: Adult Inpatient Plan of Care  Goal: Optimal Comfort and Wellbeing  Outcome: Ongoing, Progressing     Problem: Fall Injury Risk  Goal: Absence of Fall and Fall-Related Injury  Outcome: Ongoing, Progressing

## 2020-09-02 NOTE — PT/OT/SLP EVAL
"Occupational Therapy   Evaluation and Treatment    Name: Simi Encinas  MRN: [id number]  Admitting Diagnosis:  Malignant gastrointestinal stromal tumor (GIST) of stomach 1 Day Post-Op    Recommendations:     Discharge Recommendations: home  Discharge Equipment Recommendations:  none  Barriers to discharge:  None    Assessment:     Simi Encinas is a 80 y.o. female with a medical diagnosis of Malignant gastrointestinal stromal tumor (GIST) of stomach.  She presents with HOB elevated with c/o of headache. Pt pleasant with good motivation and participation. Performance deficits affecting function: weakness, impaired endurance, impaired self care skills, impaired functional mobilty, gait instability, impaired balance.  Pt would benefit from skilled OT services in order to maximize independence with ADLs and facilitate safe discharge. Anticipating pt will be able to return home with no additional OT needs when medically stable.     Rehab Prognosis: Good; patient would benefit from acute skilled OT services to address these deficits and reach maximum level of function.       Plan:     Patient to be seen 2 x/week to address the above listed problems via self-care/home management, therapeutic activities, therapeutic exercises  · Plan of Care Expires: 10/02/20  · Plan of Care Reviewed with: patient    Subjective     Chief Complaint: "People know how to pronounce my last name now thanks to Edgar Encinas"  Patient/Family Comments/goals: to return home    Occupational Profile:  Living Environment: Pt lives alone in a 2 SH with 1-2 GERM[place]N. Pt's B&B is on the first floor with a tub/shower combo and grab bars  Previous level of function: independent  Roles and Routines: Pt is active, cuts her grass and is a member of many clubs  Equipment Used at Home:  shower chair  Assistance upon Discharge: Upon discharge, pt will have assistance from family     Pain/Comfort:  · Pain Rating 1: (pt c/o of headache)    Patients cultural, spiritual, " Confucianist conflicts given the current situation: no    Objective:     Communicated with: RN and PT prior to session.  Patient found HOB elevated with PCA, peripheral IV upon OT entry to room.    General Precautions: Standard, fall   Orthopedic Precautions:N/A   Braces: N/A     Occupational Performance:    Bed Mobility:    · Patient completed Rolling/Turning to Right with stand by assistance  · Patient completed Supine to Sit with stand by assistance    Functional Mobility/Transfers:  · Patient completed Sit <> Stand Transfer with stand by assistance  with  no assistive device   · Functional Mobility: Pt ambulated household distance with CGA and no AD in order to maximize functional activity tolerance and standing balance required for engagement in occupations of choice.     Activities of Daily Living:  · Grooming: stand by assistance standing balance at the sink to perform oral hygiene    Cognitive/Visual Perceptual:  Cognitive/Psychosocial Skills:     -       Oriented to: Person, Place, Time and Situation   -       Follows Commands/attention:Follows one-step commands  -       Communication: clear/fluent  -       Memory: No Deficits noted  -       Safety awareness/insight to disability: intact   -       Mood/Affect/Coping skills/emotional control: Appropriate to situation    Physical Exam:  Upper Extremity Range of Motion:     -       Right Upper Extremity: WFL  -       Left Upper Extremity: WFL  Upper Extremity Strength:    -       Right Upper Extremity: WFL  -       Left Upper Extremity: WFL    AMPAC 6 Click ADL:  AMPAC Total Score: 19    Treatment & Education:  -Therapist provided facilitation and instruction of proper body mechanics, energy conservation, and fall prevention strategies during tasks listed above.  -Pt educated on role of OT, POC and goals for therapy  -Pt educated on importance of OOB activities with staff member assistance and sitting OOB majority of the day.   -Pt verbalized understanding. Pt  expressed no further concerns/question  -Whiteboard updated  Education:    Patient left up in chair with all lines intact, call button in reach and Rn notified    GOALS:   Multidisciplinary Problems     Occupational Therapy Goals        Problem: Occupational Therapy Goal    Goal Priority Disciplines Outcome Interventions   Occupational Therapy Goal     OT, PT/OT Ongoing, Progressing    Description: Goals to be met by: 9/16/20     Patient will increase functional independence with ADLs by performing:    Feeding with [place].  UE Dressing with [place].  LE Dressing with Supervision.  Grooming while standing at sink with [place].  Toileting from toilet with [place] for hygiene and clothing management.   Toilet transfer to toilet with [place].                     History:     Past Medical History:   Diagnosis Date    Breast CA     Hypercholesteremia        Past Surgical History:   Procedure Laterality Date    BREAST LUMPECTOMY      CHOLECYSTECTOMY N/A 9/1/2020    Procedure: CHOLECYSTECTOMY;  Surgeon: Chris Maldonado MD;  Location: [address];  Service: General;  Laterality: N/A;    ROBOT-ASSISTED SURGICAL REMOVAL OF STOMACH USING DA DANDY XI N/A 9/1/2020    Procedure: XI ROBOTIC GASTRECTOMY;  Surgeon: Chris Maldonado MD;  Location: [address];  Service: General;  Laterality: N/A;    SUBTOTAL COLECTOMY         Time Tracking:     OT Date of Treatment: 09/02/20  OT Start Time: 0942  OT Stop Time: 0958  OT Total Time (min): 16 min    Billable Minutes:Evaluation 8  Self Care/Home Management 8    Silvia Sin OT  9/2/2020

## 2020-09-02 NOTE — NURSING
Pt complaint of nausea. Zofran ODT administered. Emesis bag provided. Pt is not actively vomiting.

## 2020-09-02 NOTE — PT/OT/SLP EVAL
"Physical Therapy Evaluation    Patient Name:  Simi Encinas   MRN:  [id number]    Recommendations:     Discharge Recommendations:  home   Discharge Equipment Recommendations: none   Barriers to discharge: decreased functional mobility    Assessment:     Simi Encinas is a 80 y.o. female admitted with a medical diagnosis of Malignant gastrointestinal stromal tumor (GIST) of stomach.  She presents with the following impairments/functional limitations:  weakness, impaired endurance, impaired self care skills, impaired functional mobilty, gait instability, impaired balance.  Tolerated session c c/o headache.  Performed mobility c SBA-CGA.  Pt able to amb household distance c no AD and demo decreased gait speed, narrow SANDEEP and mild unsteadiness c turns.  Pt safe to amb c assistance of 1x person. Pt would benefit from continued skilled acute PT 3x/wk to improve functional mobility.  Anticipating pt will be able to return Home c no additional PT service needed once medically appropriate.      Rehab Prognosis: Good; patient would benefit from acute skilled PT services to address these deficits and reach maximum level of function.    Recent Surgery: Procedure(s) (LRB):  XI ROBOTIC GASTRECTOMY (N/A)  CHOLECYSTECTOMY (N/A) 1 Day Post-Op    Plan:     During this hospitalization, patient to be seen 3 x/week to address the identified rehab impairments via gait training, therapeutic activities, therapeutic exercises, neuromuscular re-education and progress toward the following goals:    · Plan of Care Expires:  09/27/20    Subjective     Chief Complaint: headache  Patient/Family Comments/goals: "I haven't pressed the button all day." - re to PCA  Pain/Comfort:  · Pain Rating 1: (reports headache)    Patients cultural, spiritual, Yazidism conflicts given the current situation: no    Living Environment:  Pt lives alone in 2SH c 2STE.  Living quarters on 1st floor.  PTA driving, not working, part of multiple clubs and no " falls.  Prior to admission, patients level of function was independent c ADLs and used SPC PRN secondary to B knee pain.  Equipment used at home: grab bar, shower chair.  DME owned (not currently used): none.  Upon discharge, patient will have assistance from family.    Objective:     Communicated with RN and OT prior to session.  Patient found HOB elevated with telemetry, PCA, peripheral IV  upon PT entry to room.    General Precautions: Standard, fall   Orthopedic Precautions:N/A   Braces: N/A     Exams:  · Cognitive Exam:  Patient is oriented to Person, Place, Time and Situation  · RLE ROM: WFL  · RLE Strength: WFL  · LLE ROM: WFL  · LLE Strength: WFL    Functional Mobility:  · Bed Mobility:     · Rolling Right: stand by assistance  · Scooting: stand by assistance  · Supine to Sit: stand by assistance  · Transfers:     · Sit to Stand:  stand by assistance with no AD  · Bed to Chair: contact guard assistance with  no AD  using  Step Transfer  · Gait: 120ft c no AD CGA   · demo decreased gait speed, narrow SANDEEP and mild unsteadiness c turns  · Balance: standing (CGA)    Therapeutic Activities and Exercises:  Pt educated on: PT role/POC; safety c mobility; benefits of OOB activities; performing therex; d/c recs - v/u  -sat EOB x5mins  -sit<>Stand 3x  -standing x3mins  -therex (LAQ, hip flex, AP)    AM-PAC 6 CLICK MOBILITY  Total Score:19     Patient left up in chair with all lines intact, call button in reach and RN notified.    GOALS:   Multidisciplinary Problems     Physical Therapy Goals        Problem: Physical Therapy Goal    Goal Priority Disciplines Outcome Goal Variances Interventions   Physical Therapy Goal     PT, PT/OT Ongoing, Progressing     Description: Goals to be met by: 2020     Patient will increase functional independence with mobility by performin. Supine to sit with Modified [place]  2. Sit to supine with Modified [place]  3. Sit to stand transfer with Supervision  4. Bed  to chair transfer with Supervision   5. Gait  x 150 feet with Supervision  6. Ascend/descend 2 stair with Supervision                      History:     Past Medical History:   Diagnosis Date    Breast CA     Hypercholesteremia        Past Surgical History:   Procedure Laterality Date    BREAST LUMPECTOMY      CHOLECYSTECTOMY N/A 9/1/2020    Procedure: CHOLECYSTECTOMY;  Surgeon: Chris Maldonado MD;  Location: [address];  Service: General;  Laterality: N/A;    ROBOT-ASSISTED SURGICAL REMOVAL OF STOMACH USING DA DANDY XI N/A 9/1/2020    Procedure: XI ROBOTIC GASTRECTOMY;  Surgeon: Chris Maldonado MD;  Location: [address];  Service: General;  Laterality: N/A;    SUBTOTAL COLECTOMY         Time Tracking:     PT Received On: 09/02/20  PT Start Time: 0940     PT Stop Time: 0958  PT Total Time (min): 18 min     Billable Minutes: Evaluation 10 min and Therapeutic Activity 8 min      Christophe Veloz, PT  09/02/2020

## 2020-09-02 NOTE — PLAN OF CARE
Problem: Occupational Therapy Goal  Goal: Occupational Therapy Goal  Description: Goals to be met by: 9/16/20     Patient will increase functional independence with ADLs by performing:    Feeding with [place].  UE Dressing with [place].  LE Dressing with Supervision.  Grooming while standing at sink with [place].  Toileting from toilet with [place] for hygiene and clothing management.   Toilet transfer to toilet with [place].    Outcome: Ongoing, Progressing     Evaluation complete-see note for details. Goals and POC established.    DENNY Martins  9/2/2020   Pager #: [phone]

## 2020-09-02 NOTE — SUBJECTIVE & OBJECTIVE
Interval History: TIFFANI. AVFSS. Minor nausea overnight, did not ask for PRN meds. Has not yet ambulated. Is OK to have sips of clears today, will take it slow.     Medications:  Continuous Infusions:   hydromorphone in 0.9 % NaCl 6 mg/30 ml      lactated ringers 100 mL/hr at 09/02/20 0502     Scheduled Meds:   acetaminophen  1,000 mg Intravenous Q8H    levalbuterol  0.63 mg Nebulization Q6H WAKE    pantoprazole  40 mg Intravenous Daily     PRN Meds:melatonin, naloxone, ondansetron, sodium chloride 0.9%     Review of patient's allergies indicates:   Allergen Reactions    Statins-hmg-coa reductase inhibitors Other (See Comments)     Objective:     Vital Signs (Most Recent):  Temp: 97.9 °F (36.6 °C) (09/02/20 0727)  Pulse: 72 (09/02/20 0840)  Resp: 14 (09/02/20 0840)  BP: 131/62 (09/02/20 0727)  SpO2: 96 % (09/02/20 0840) Vital Signs (24h Range):  Temp:  [97.8 °F (36.6 °C)-98.2 °F (36.8 °C)] 97.9 °F (36.6 °C)  Pulse:  [72-89] 72  Resp:  [10-20] 14  SpO2:  [93 %-100 %] 96 %  BP: (117-142)/(56-83) 131/62     Weight: 64 kg (141 lb)  Body mass index is 24.98 kg/m².    Intake/Output - Last 3 Shifts       08/31 0700 - 09/01 0659 09/01 0700 - 09/02 0659 09/02 0700 - 09/03 0659    I.V. (mL/kg)  3221 (50.3)     Total Intake(mL/kg)  3221 (50.3)     Urine (mL/kg/hr)  4150 (2.7)     Stool  0     Total Output  4150     Net  -929            Stool Occurrence  0 x           Physical Exam  Constitutional:       Appearance: Normal appearance. She is normal weight.   HENT:      Head: Normocephalic and atraumatic.      Mouth/Throat:      Mouth: Mucous membranes are moist.   Cardiovascular:      Rate and Rhythm: Normal rate.   Pulmonary:      Effort: Pulmonary effort is normal. No respiratory distress.   Abdominal:      General: Abdomen is flat. There is no distension.      Palpations: Abdomen is soft.      Tenderness: There is abdominal tenderness.      Comments: Minor tenderness. Incision clean, dry, intact.    Skin:     General:  Skin is warm and dry.   Neurological:      General: No focal deficit present.      Mental Status: She is alert and oriented to person, place, and time.   Psychiatric:         Mood and Affect: Mood normal.         Behavior: Behavior normal.         Significant Labs:  CBC:   Recent Labs   Lab 09/02/20 0317   WBC 11.33   RBC 3.27*   HGB 8.9*   HCT 28.9*   *   MCV 88   MCH 27.2   MCHC 30.8*     BMP:   Recent Labs   Lab 09/02/20 0317         K 4.1      CO2 21*   BUN 7*   CREATININE 0.6   CALCIUM 7.5*   MG 1.4*     CMP:   Recent Labs   Lab 09/01/20  0545  09/02/20 0317      < > 108   CALCIUM 9.5   < > 7.5*   ALBUMIN 3.0*  --   --    PROT 7.0  --   --       < > 138   K 4.2   < > 4.1   CO2 27   < > 21*      < > 106   BUN 13   < > 7*   CREATININE 0.8   < > 0.6   ALKPHOS 113  --   --    ALT 24  --   --    AST 29  --   --    BILITOT 0.3  --   --     < > = values in this interval not displayed.       Significant Diagnostics:  I have reviewed all pertinent imaging results/findings within the past 24 hours.

## 2020-09-02 NOTE — ANESTHESIA POSTPROCEDURE EVALUATION
Anesthesia Post Evaluation    Patient: Simi Encinas    Procedure(s) Performed: Procedure(s) (LRB):  XI ROBOTIC GASTRECTOMY (N/A)  CHOLECYSTECTOMY (N/A)    Final Anesthesia Type: general    Patient location during evaluation: floor  Patient participation: Yes- Able to Participate  Level of consciousness: awake and alert and oriented  Post-procedure vital signs: reviewed and stable  Pain management: adequate  Airway patency: patent    PONV status at discharge: No PONV  Anesthetic complications: no      Cardiovascular status: blood pressure returned to baseline  Respiratory status: unassisted, spontaneous ventilation and room air  Hydration status: euvolemic  Follow-up not needed.          Vitals Value Taken Time   /67 09/02/20 0437   Temp 36.6 °C (97.8 °F) 09/02/20 0012   Pulse 81 09/02/20 0437   Resp 16 09/02/20 0437   SpO2 99 % 09/02/20 0437         Event Time   Out of Recovery 14:45:00         Pain/Serg Score: Pain Rating Prior to Med Admin: 7 (9/2/2020  5:09 AM)  Pain Rating Post Med Admin: 3 (9/2/2020  5:39 AM)  Serg Score: 9 (9/1/2020  5:15 PM)

## 2020-09-02 NOTE — NURSING
Assumed care of pt bed 1024. Per report pt is new admit. Pt reports tumor resection and cholecystectomy. Surgical incision noted to abdomen. No drainage noted. Pink noted to incision sites. Pt reports no discomfort at this time. Pt requesting medication to aid sleep. Instructed pt melatonin is available. Pt agrees to medication. Daughter at bedside. Pt with PCA pump as well as continuous IVF infusion. Pt denies pain to IV site. Pt denies n/v/d. Pt passing flatus and denies discomfort during abdominal assessment. Pt able to turn and reposition self. Assessment done per flowsheet. NAD noted. Awaiting MD orders / disposition. Pt and family requesting information on plan for discharge. Bed rails up x 2 with bed locked in lowest position. Call light in reach. Will continue to monitor. ADMIT evaluation continues. Pt verbalizes understanding of plan of care.

## 2020-09-02 NOTE — NURSING
Pt sitting up in bed at this time with television turned off. Resp shallow. NAD noted. Easily arousable to verbal stimuli. Offers complaints of nausea at this time with dry heaving. Pt provided with antiemetic per MAR. Awaiting MD orders/ disposition. Bed rails up x 2 with bed locked in lowest position. Call light in reach. Will continue to monitor. ADMIT evaluation continues.

## 2020-09-02 NOTE — PLAN OF CARE
Patient is a 80 year old female admitted from home 9/1/2020 and underwent Robotic Resection Gist Tumor of Stomach, Cholecystectomy.  Patient is expected to discharge home with no needs +/- 9/3/2020.    Patient in room with daughter during visit.  Patient lives alone in a two story home with first floor bed and bathroom.  Patient's daughter, Monica, will drive her home and she and/or one of her siblings will care for patient and obtain anything she needs after discharge.  [facility] Packet given to patient with understanding verbalized.  CM name and number written on white board in patient's room with request to call for any questions and concerns.  Will continue to follow for needs.    PCP  Walter Navarro MD  [address] [place] [address] / [place] [address] [phone]      [id number] - [place] - [address] AT [facility]  [address]  [place] [address]  Phone: [phone] Fax: [phone]    [facility] - [facility] [address] [facility] [address]  [address] [facility] [address]  Phone: [phone] Fax: [phone]    Extended Emergency Contact Information  Primary Emergency Contact: Hector Encinas   [place]  Mobile Phone: [phone]  Relation: Son  Secondary Emergency Contact: June Encinas  Mobile Phone: [phone]  Relation: Daughter  Preferred language: English       09/02/20 1527   Discharge Assessment   Assessment Type Discharge Planning Assessment   Confirmed/corrected address and phone number on facesheet? Yes   Assessment information obtained from? Patient   Expected Length of Stay (days) 3   Communicated expected length of stay with patient/caregiver yes   Prior to hospitilization cognitive status: Alert/Oriented   Prior to hospitalization functional status: Independent   Current cognitive status: Alert/Oriented   Current Functional Status: Independent;Needs Assistance   Lives With alone   Able to  Return to Prior Arrangements yes   Is patient able to care for self after discharge? Yes   Who are your caregiver(s) and their phone number(s)? family   Patient's perception of discharge disposition home or selfcare   Equipment Currently Used at Home cane, straight;grab bar   Do you have any problems affording any of your prescribed medications? No   Is the patient taking medications as prescribed? yes   Does the patient have transportation home? Yes   Transportation Anticipated family or friend will provide   Discharge Plan A Home with family   Discharge Plan B Home Health   Patient/Family in Agreement with Plan yes   Does the patient have transportation to healthcare appointments? Yes

## 2020-09-02 NOTE — RESPIRATORY THERAPY
Rapid Response Respiratory Therapy ETCO2 Check     Time of visit: 701    Code Status: Prior   : 1940  Bed: 1024/1024 A:   MRN: [id number]    SITUATION     Evaluated patient for: ETCO2 Compliance     BACKGROUND     Patient has a past medical history of Breast CA and Hypercholesteremia.    ASSESSMENT     Is the ETCO2 monitor on? (Yes/No)  yes   Is the patient wearing a cannula? (Yes/No) yes  Are ETCO2 orders placed? (Yes/No) yes  Is the patient on a PCA pump? (Yes/No) yes  ETCO2 monitored: ETCO2 (mmHg): 39 mmHg  O2 Device/Concentration:     Pulse:    Respiratory rate:    Temperature: Temp: 97.9 °F (36.6 °C) BP: BP: 131/62 SpO2:     Level of Consciousness: Level of Consciousness (AVPU): alert  All Lung Field Breath Sounds: All Lung Fields Breath Sounds: diminished  TEGAN Breath Sounds: diminished  LLL Breath Sounds: diminished  RUL Breath Sounds: diminished  RML Breath Sounds: diminished  RLL Breath Sounds: diminished  Ambu at bedside: Ambu bag with the patient?: Yes, Adult Ambu    INTERVENTIONS/RECOMMENDATIONS           PHYSICIAN ESCALATION     Physician Escalation (Yes/No):        Care discussed with:     Discussed plan of care primary RT,        FOLLOW-UP     Please call back the Rapid Response RT, Sherron Mora, RRT at x [phone] for any questions or concerns.

## 2020-09-02 NOTE — NURSING
Pt resting quietly in bed with eyes closed. Resp unlabored. NAD noted. Easily arousable to verbal stimuli. SCDs on and intact. Pt offers no complaints at present. Awaiting MD orders/disposition. Call light in reach. Bed rails up x 2 with bed locked in lowest position. Will continue to monitor. ADMIT evaluation continues.

## 2020-09-03 LAB
ALBUMIN SERPL BCP-MCNC: 2.2 G/DL (ref 3.5–5.2)
ALBUMIN SERPL BCP-MCNC: 2.6 G/DL (ref 3.5–5.2)
ALP SERPL-CCNC: 80 U/L (ref 55–135)
ALP SERPL-CCNC: 92 U/L (ref 55–135)
ALT SERPL W/O P-5'-P-CCNC: 33 U/L (ref 10–44)
ALT SERPL W/O P-5'-P-CCNC: 40 U/L (ref 10–44)
ANION GAP SERPL CALC-SCNC: 3 MMOL/L (ref 8–16)
ANION GAP SERPL CALC-SCNC: 7 MMOL/L (ref 8–16)
AST SERPL-CCNC: 38 U/L (ref 10–40)
AST SERPL-CCNC: 47 U/L (ref 10–40)
BASOPHILS # BLD AUTO: 0.04 K/UL (ref 0–0.2)
BASOPHILS NFR BLD: 0.5 % (ref 0–1.9)
BILIRUB SERPL-MCNC: 0.2 MG/DL (ref 0.1–1)
BILIRUB SERPL-MCNC: 0.2 MG/DL (ref 0.1–1)
BUN SERPL-MCNC: 5 MG/DL (ref 8–23)
BUN SERPL-MCNC: 5 MG/DL (ref 8–23)
CALCIUM SERPL-MCNC: 7 MG/DL (ref 8.7–10.5)
CALCIUM SERPL-MCNC: 7.8 MG/DL (ref 8.7–10.5)
CHLORIDE SERPL-SCNC: 109 MMOL/L (ref 95–110)
CHLORIDE SERPL-SCNC: 98 MMOL/L (ref 95–110)
CO2 SERPL-SCNC: 24 MMOL/L (ref 23–29)
CO2 SERPL-SCNC: 24 MMOL/L (ref 23–29)
CREAT SERPL-MCNC: 0.6 MG/DL (ref 0.5–1.4)
CREAT SERPL-MCNC: 0.7 MG/DL (ref 0.5–1.4)
DIFFERENTIAL METHOD: ABNORMAL
EOSINOPHIL # BLD AUTO: 0.2 K/UL (ref 0–0.5)
EOSINOPHIL NFR BLD: 2.5 % (ref 0–8)
ERYTHROCYTE [DISTWIDTH] IN BLOOD BY AUTOMATED COUNT: 13.8 % (ref 11.5–14.5)
EST. GFR  (AFRICAN AMERICAN): >60 ML/MIN/1.73 M^2
EST. GFR  (AFRICAN AMERICAN): >60 ML/MIN/1.73 M^2
EST. GFR  (NON AFRICAN AMERICAN): >60 ML/MIN/1.73 M^2
EST. GFR  (NON AFRICAN AMERICAN): >60 ML/MIN/1.73 M^2
GLUCOSE SERPL-MCNC: 109 MG/DL (ref 70–110)
GLUCOSE SERPL-MCNC: 137 MG/DL (ref 70–110)
HCT VFR BLD AUTO: 31.9 % (ref 37–48.5)
HGB BLD-MCNC: 9.9 G/DL (ref 12–16)
IMM GRANULOCYTES # BLD AUTO: 0.02 K/UL (ref 0–0.04)
IMM GRANULOCYTES NFR BLD AUTO: 0.2 % (ref 0–0.5)
LYMPHOCYTES # BLD AUTO: 1.7 K/UL (ref 1–4.8)
LYMPHOCYTES NFR BLD: 21.2 % (ref 18–48)
MAGNESIUM SERPL-MCNC: 2.1 MG/DL (ref 1.6–2.6)
MCH RBC QN AUTO: 27.6 PG (ref 27–31)
MCHC RBC AUTO-ENTMCNC: 31 G/DL (ref 32–36)
MCV RBC AUTO: 89 FL (ref 82–98)
MONOCYTES # BLD AUTO: 0.6 K/UL (ref 0.3–1)
MONOCYTES NFR BLD: 8 % (ref 4–15)
NEUTROPHILS # BLD AUTO: 5.4 K/UL (ref 1.8–7.7)
NEUTROPHILS NFR BLD: 67.6 % (ref 38–73)
NRBC BLD-RTO: 0 /100 WBC
PHOSPHATE SERPL-MCNC: 1.5 MG/DL (ref 2.7–4.5)
PLATELET # BLD AUTO: 420 K/UL (ref 150–350)
PMV BLD AUTO: 10.5 FL (ref 9.2–12.9)
POTASSIUM SERPL-SCNC: 3.8 MMOL/L (ref 3.5–5.1)
POTASSIUM SERPL-SCNC: 4.1 MMOL/L (ref 3.5–5.1)
PROT SERPL-MCNC: 5.4 G/DL (ref 6–8.4)
PROT SERPL-MCNC: 6 G/DL (ref 6–8.4)
RBC # BLD AUTO: 3.59 M/UL (ref 4–5.4)
SODIUM SERPL-SCNC: 125 MMOL/L (ref 136–145)
SODIUM SERPL-SCNC: 136 MMOL/L (ref 136–145)
SODIUM SERPL-SCNC: 140 MMOL/L (ref 136–145)
WBC # BLD AUTO: 8.05 K/UL (ref 3.9–12.7)

## 2020-09-03 PROCEDURE — 97116 GAIT TRAINING THERAPY: CPT | Mod: CQ

## 2020-09-03 PROCEDURE — 84100 ASSAY OF PHOSPHORUS: CPT

## 2020-09-03 PROCEDURE — 36415 COLL VENOUS BLD VENIPUNCTURE: CPT

## 2020-09-03 PROCEDURE — 25000003 PHARM REV CODE 250: Performed by: STUDENT IN AN ORGANIZED HEALTH CARE EDUCATION/TRAINING PROGRAM

## 2020-09-03 PROCEDURE — 84295 ASSAY OF SERUM SODIUM: CPT

## 2020-09-03 PROCEDURE — 25000003 PHARM REV CODE 250: Performed by: NURSE PRACTITIONER

## 2020-09-03 PROCEDURE — 94761 N-INVAS EAR/PLS OXIMETRY MLT: CPT

## 2020-09-03 PROCEDURE — 99900035 HC TECH TIME PER 15 MIN (STAT)

## 2020-09-03 PROCEDURE — 94664 DEMO&/EVAL PT USE INHALER: CPT

## 2020-09-03 PROCEDURE — 85025 COMPLETE CBC W/AUTO DIFF WBC: CPT

## 2020-09-03 PROCEDURE — C9113 INJ PANTOPRAZOLE SODIUM, VIA: HCPCS | Performed by: STUDENT IN AN ORGANIZED HEALTH CARE EDUCATION/TRAINING PROGRAM

## 2020-09-03 PROCEDURE — 83735 ASSAY OF MAGNESIUM: CPT

## 2020-09-03 PROCEDURE — 63600175 PHARM REV CODE 636 W HCPCS: Performed by: STUDENT IN AN ORGANIZED HEALTH CARE EDUCATION/TRAINING PROGRAM

## 2020-09-03 PROCEDURE — 80053 COMPREHEN METABOLIC PANEL: CPT | Mod: 91

## 2020-09-03 PROCEDURE — 80053 COMPREHEN METABOLIC PANEL: CPT

## 2020-09-03 PROCEDURE — 63600175 PHARM REV CODE 636 W HCPCS: Performed by: NURSE PRACTITIONER

## 2020-09-03 PROCEDURE — 20600001 HC STEP DOWN PRIVATE ROOM

## 2020-09-03 PROCEDURE — 94770 HC EXHALED C02 TEST: CPT

## 2020-09-03 RX ORDER — ENOXAPARIN SODIUM 100 MG/ML
40 INJECTION SUBCUTANEOUS DAILY
Qty: 5.6 ML | Refills: 0 | Status: SHIPPED | OUTPATIENT
Start: 2020-09-03 | End: 2020-09-14 | Stop reason: ALTCHOICE

## 2020-09-03 RX ORDER — POLYETHYLENE GLYCOL 3350 17 G/17G
17 POWDER, FOR SOLUTION ORAL DAILY
Status: DISCONTINUED | OUTPATIENT
Start: 2020-09-03 | End: 2020-09-04 | Stop reason: HOSPADM

## 2020-09-03 RX ORDER — KETOROLAC TROMETHAMINE 15 MG/ML
15 INJECTION, SOLUTION INTRAMUSCULAR; INTRAVENOUS EVERY 6 HOURS PRN
Status: DISCONTINUED | OUTPATIENT
Start: 2020-09-03 | End: 2020-09-03

## 2020-09-03 RX ORDER — PANTOPRAZOLE SODIUM 40 MG/1
40 TABLET, DELAYED RELEASE ORAL DAILY
Status: DISCONTINUED | OUTPATIENT
Start: 2020-09-04 | End: 2020-09-04 | Stop reason: HOSPADM

## 2020-09-03 RX ORDER — OXYCODONE HYDROCHLORIDE 5 MG/1
5 TABLET ORAL EVERY 6 HOURS PRN
Status: DISCONTINUED | OUTPATIENT
Start: 2020-09-03 | End: 2020-09-03

## 2020-09-03 RX ORDER — TRAMADOL HYDROCHLORIDE 50 MG/1
50 TABLET ORAL EVERY 4 HOURS PRN
Status: DISCONTINUED | OUTPATIENT
Start: 2020-09-03 | End: 2020-09-04 | Stop reason: HOSPADM

## 2020-09-03 RX ORDER — HYDROMORPHONE HYDROCHLORIDE 1 MG/ML
0.2 INJECTION, SOLUTION INTRAMUSCULAR; INTRAVENOUS; SUBCUTANEOUS EVERY 6 HOURS PRN
Status: DISCONTINUED | OUTPATIENT
Start: 2020-09-03 | End: 2020-09-04 | Stop reason: HOSPADM

## 2020-09-03 RX ADMIN — TRAMADOL HYDROCHLORIDE 50 MG: 50 TABLET, FILM COATED ORAL at 05:09

## 2020-09-03 RX ADMIN — ENOXAPARIN SODIUM 40 MG: 40 INJECTION SUBCUTANEOUS at 05:09

## 2020-09-03 RX ADMIN — EZETIMIBE 10 MG: 10 TABLET ORAL at 09:09

## 2020-09-03 RX ADMIN — POTASSIUM CHLORIDE, DEXTROSE MONOHYDRATE AND SODIUM CHLORIDE: 150; 5; 450 INJECTION, SOLUTION INTRAVENOUS at 06:09

## 2020-09-03 RX ADMIN — POTASSIUM CHLORIDE, DEXTROSE MONOHYDRATE AND SODIUM CHLORIDE: 150; 5; 450 INJECTION, SOLUTION INTRAVENOUS at 05:09

## 2020-09-03 RX ADMIN — PANTOPRAZOLE SODIUM 40 MG: 40 INJECTION, POWDER, LYOPHILIZED, FOR SOLUTION INTRAVENOUS at 09:09

## 2020-09-03 RX ADMIN — ACETAMINOPHEN 1000 MG: 10 INJECTION, SOLUTION INTRAVENOUS at 05:09

## 2020-09-03 RX ADMIN — POLYETHYLENE GLYCOL 3350 17 G: 17 POWDER, FOR SOLUTION ORAL at 12:09

## 2020-09-03 NOTE — PT/OT/SLP PROGRESS
Physical Therapy Treatment    Patient Name:  Simi Encinas   MRN:  [id number]    Recommendations:     Discharge Recommendations:  home   Discharge Equipment Recommendations: none   Barriers to discharge: decreased functional mobility    Assessment:     Simi Encinas is a 80 y.o. female admitted with a medical diagnosis of Malignant gastrointestinal stromal tumor (GIST) of stomach.  She presents with the following impairments/functional limitations:  weakness, impaired endurance, impaired self care skills, impaired functional mobilty, gait instability, impaired balance.    Rehab Prognosis: Good; patient would benefit from acute skilled PT services to address these deficits and reach maximum level of function.    Recent Surgery: Procedure(s) (LRB):  XI ROBOTIC GASTRECTOMY (N/A)  CHOLECYSTECTOMY (N/A) 2 Days Post-Op    Plan:     During this hospitalization, patient to be seen 3 x/week to address the identified rehab impairments via gait training, therapeutic activities, therapeutic exercises, neuromuscular re-education and progress toward the following goals:    · Plan of Care Expires:  09/27/20    Subjective     Chief Complaint: no c/o  Pain/Comfort:  · Pain Rating 1: 0/10  · Pain Rating Post-Intervention 1: 0/10      Objective:     Communicated with NSG prior to session.  Patient found up in chair with telemetry, PCA, peripheral IV upon PTA entry to room. NSG enters to bedside to administer meds and discontinues pts PCA and one of her IV lines. Only telemetry and single IV remaining.     General Precautions: Standard, fall   Orthopedic Precautions:N/A   Braces: N/A     Functional Mobility:  · Transfers:     · Sit to Stand:  supervision with no AD  · Gait: Pt ambulates ~300 ft with no AD and supervision. Pt with slow but steady neema. Decreased B step length and clearance. No overt LOB.   · Stairs:  Pt ascended/descended 2 stair(s) x 2 with No Assistive Device with left handrail with Supervision or Set-up Assistance.        AM-PAC 6 CLICK MOBILITY  Turning over in bed (including adjusting bedclothes, sheets and blankets)?: 4  Sitting down on and standing up from a chair with arms (e.g., wheelchair, bedside commode, etc.): 3  Moving from lying on back to sitting on the side of the bed?: 4  Moving to and from a bed to a chair (including a wheelchair)?: 3  Need to walk in hospital room?: 3  Climbing 3-5 steps with a railing?: 3  Basic Mobility Total Score: 20     Patient left up in chair with all lines intact and call button in reach.    GOALS:   Multidisciplinary Problems     Physical Therapy Goals        Problem: Physical Therapy Goal    Goal Priority Disciplines Outcome Goal Variances Interventions   Physical Therapy Goal     PT, PT/OT Ongoing, Progressing     Description: Goals to be met by: 2020     Patient will increase functional independence with mobility by performin. Supine to sit with Modified [place]  2. Sit to supine with Modified [place]  3. Sit to stand transfer with Supervision  4. Bed to chair transfer with Supervision   5. Gait  x 150 feet with Supervision  6. Ascend/descend 2 stair with Supervision                      Time Tracking:     PT Received On: 20  PT Start Time: 936     PT Stop Time: 1000  PT Total Time (min): 24 min     Billable Minutes: Gait Training 24    Treatment Type: Treatment  PT/PTA: PTA     PTA Visit Number: 1     Johny David PTA  2020

## 2020-09-03 NOTE — NURSING
POC reviewed with patient and verbilizes understanding. VSS. AWAke alert. Ambulating aleman and in room with little assistance.IVF at 40cc/hr. Tolerating clear liquids. Telemetry maintained NSR. Mild pain at this time. SARAY

## 2020-09-03 NOTE — SUBJECTIVE & OBJECTIVE
Interval History: CODIE BAILEY. Reports her pain is under control , denies nausea, vomiting, tolerating sips of liquid.     Medications:  Continuous Infusions:   dextrose 5 % and 0.45 % NaCl with KCl 20 mEq 50 mL/hr at 09/03/20 0944     Scheduled Meds:   acetaminophen  1,000 mg Intravenous Q8H    enoxaparin  40 mg Subcutaneous Q24H    ezetimibe  10 mg Oral Daily    levalbuterol  0.63 mg Nebulization Q6H WAKE    pantoprazole  40 mg Intravenous Daily     PRN Meds:HYDROmorphone, melatonin, ondansetron, oxyCODONE, sodium chloride 0.9%     Review of patient's allergies indicates:   Allergen Reactions    Statins-hmg-coa reductase inhibitors Other (See Comments)     Objective:     Vital Signs (Most Recent):  Temp: 97.7 °F (36.5 °C) (09/03/20 0800)  Pulse: 102 (09/03/20 0800)  Resp: 18 (09/03/20 0800)  BP: 137/78 (09/03/20 0800)  SpO2: 100 % (09/03/20 0800) Vital Signs (24h Range):  Temp:  [96.6 °F (35.9 °C)-98.4 °F (36.9 °C)] 97.7 °F (36.5 °C)  Pulse:  [] 102  Resp:  [14-20] 18  SpO2:  [93 %-100 %] 100 %  BP: (107-137)/(55-78) 137/78     Weight: 64 kg (141 lb)  Body mass index is 24.98 kg/m².    Intake/Output - Last 3 Shifts       09/01 0700 - 09/02 0659 09/02 0700 - 09/03 0659 09/03 0700 - 09/04 0659    I.V. (mL/kg) 3221 (50.3)      Total Intake(mL/kg) 3221 (50.3)      Urine (mL/kg/hr) 4150 (2.7)  250 (1)    Stool 0      Total Output 4150  250    Net -929  -250           Urine Occurrence  2 x     Stool Occurrence 0 x            Physical Exam  Constitutional:       Appearance: Normal appearance. She is normal weight.   HENT:      Head: Normocephalic and atraumatic.      Mouth/Throat:      Mouth: Mucous membranes are moist.   Cardiovascular:      Rate and Rhythm: Normal rate.   Pulmonary:      Effort: Pulmonary effort is normal. No respiratory distress.   Abdominal:      General: Abdomen is flat. There is no distension.      Palpations: Abdomen is soft.      Tenderness: There is abdominal tenderness.       Comments: Minor tenderness. Incision clean, dry, intact.    Skin:     General: Skin is warm and dry.   Neurological:      General: No focal deficit present.      Mental Status: She is alert and oriented to person, place, and time.   Psychiatric:         Mood and Affect: Mood normal.         Behavior: Behavior normal.         Significant Labs:  CBC:   Recent Labs   Lab 09/03/20 0343   WBC 8.05   RBC 3.59*   HGB 9.9*   HCT 31.9*   *   MCV 89   MCH 27.6   MCHC 31.0*     BMP:   Recent Labs   Lab 09/03/20 0343 09/03/20  0553 09/03/20  0940   * 109  --     125* 136   K 4.1 3.8  --     98  --    CO2 24 24  --    BUN 5* 5*  --    CREATININE 0.7 0.6  --    CALCIUM 7.8* 7.0*  --    MG 2.1  --   --      CMP:   Recent Labs   Lab 09/03/20  0553 09/03/20 0940     --    CALCIUM 7.0*  --    ALBUMIN 2.2*  --    PROT 5.4*  --    * 136   K 3.8  --    CO2 24  --    CL 98  --    BUN 5*  --    CREATININE 0.6  --    ALKPHOS 80  --    ALT 33  --    AST 38  --    BILITOT 0.2  --        Significant Diagnostics:  I have reviewed all pertinent imaging results/findings within the past 24 hours.

## 2020-09-03 NOTE — PLAN OF CARE
POC reviewed. AAOx4, VSS on RA . No complaints of SOB, headaches, or dizziness. Tolerating clear liquid diet, no BM and no complaints of nausea of N/V. Patient pain well controlled with dilaudid PCA. Telemetry monitored NSR. Resting quietly with side rails up and call light with in reach. Continuing to monitor patient status.

## 2020-09-03 NOTE — ASSESSMENT & PLAN NOTE
80y F w/ hx of GIST of stomach now s/p robotic gastrectomy.     - Diet: Full liquid diet  - PRN Nausea and pain control   - If tolerates FLD, will d/c PCA and begin PO pain regimen  - IVF  - Encourage ambulation, OOBTC  - PT &OT  - DVT ppx, encourage IS, acapella  - bowel regimen

## 2020-09-03 NOTE — PROGRESS NOTES
[facility]  General Surgery  Progress Note    Subjective:       Post-Op Info:  Procedure(s) (LRB):  XI ROBOTIC GASTRECTOMY (N/A)  CHOLECYSTECTOMY (N/A)   2 Days Post-Op     Interval History: VEENAONTamara BAILEY. Reports her pain is under control , denies nausea, vomiting, tolerating sips of liquid.     Medications:  Continuous Infusions:   dextrose 5 % and 0.45 % NaCl with KCl 20 mEq 50 mL/hr at 09/03/20 0944     Scheduled Meds:   acetaminophen  1,000 mg Intravenous Q8H    enoxaparin  40 mg Subcutaneous Q24H    ezetimibe  10 mg Oral Daily    levalbuterol  0.63 mg Nebulization Q6H WAKE    pantoprazole  40 mg Intravenous Daily     PRN Meds:HYDROmorphone, melatonin, ondansetron, oxyCODONE, sodium chloride 0.9%     Review of patient's allergies indicates:   Allergen Reactions    Statins-hmg-coa reductase inhibitors Other (See Comments)     Objective:     Vital Signs (Most Recent):  Temp: 97.7 °F (36.5 °C) (09/03/20 0800)  Pulse: 102 (09/03/20 0800)  Resp: 18 (09/03/20 0800)  BP: 137/78 (09/03/20 0800)  SpO2: 100 % (09/03/20 0800) Vital Signs (24h Range):  Temp:  [96.6 °F (35.9 °C)-98.4 °F (36.9 °C)] 97.7 °F (36.5 °C)  Pulse:  [] 102  Resp:  [14-20] 18  SpO2:  [93 %-100 %] 100 %  BP: (107-137)/(55-78) 137/78     Weight: 64 kg (141 lb)  Body mass index is 24.98 kg/m².    Intake/Output - Last 3 Shifts       09/01 0700 - 09/02 0659 09/02 0700 - 09/03 0659 09/03 0700 - 09/04 0659    I.V. (mL/kg) 3221 (50.3)      Total Intake(mL/kg) 3221 (50.3)      Urine (mL/kg/hr) 4150 (2.7)  250 (1)    Stool 0      Total Output 4150  250    Net -929  -250           Urine Occurrence  2 x     Stool Occurrence 0 x            Physical Exam  Constitutional:       Appearance: Normal appearance. She is normal weight.   HENT:      Head: Normocephalic and atraumatic.      Mouth/Throat:      Mouth: Mucous membranes are moist.   Cardiovascular:      Rate and Rhythm: Normal rate.   Pulmonary:      Effort: Pulmonary effort is  normal. No respiratory distress.   Abdominal:      General: Abdomen is flat. There is no distension.      Palpations: Abdomen is soft.      Tenderness: There is abdominal tenderness.      Comments: Minor tenderness. Incision clean, dry, intact.    Skin:     General: Skin is warm and dry.   Neurological:      General: No focal deficit present.      Mental Status: She is alert and oriented to person, place, and time.   Psychiatric:         Mood and Affect: Mood normal.         Behavior: Behavior normal.         Significant Labs:  CBC:   Recent Labs   Lab 09/03/20  0343   WBC 8.05   RBC 3.59*   HGB 9.9*   HCT 31.9*   *   MCV 89   MCH 27.6   MCHC 31.0*     BMP:   Recent Labs   Lab 09/03/20  0343 09/03/20  0553 09/03/20  0940   * 109  --     125* 136   K 4.1 3.8  --     98  --    CO2 24 24  --    BUN 5* 5*  --    CREATININE 0.7 0.6  --    CALCIUM 7.8* 7.0*  --    MG 2.1  --   --      CMP:   Recent Labs   Lab 09/03/20  0553 09/03/20  0940     --    CALCIUM 7.0*  --    ALBUMIN 2.2*  --    PROT 5.4*  --    * 136   K 3.8  --    CO2 24  --    CL 98  --    BUN 5*  --    CREATININE 0.6  --    ALKPHOS 80  --    ALT 33  --    AST 38  --    BILITOT 0.2  --        Significant Diagnostics:  I have reviewed all pertinent imaging results/findings within the past 24 hours.    Assessment/Plan:     * Malignant gastrointestinal stromal tumor (GIST) of stomach  80y F w/ hx of GIST of stomach now s/p robotic gastrectomy.     - Diet: Full liquid diet  - PRN Nausea and pain control   - If tolerates FLD, will d/c PCA and begin PO pain regimen  - IVF  - Encourage ambulation, OOBTC  - PT &OT  - DVT ppx, encourage IS, acapella  - bowel regimen        Мария Potts MD  General Surgery  [facility]

## 2020-09-04 VITALS
HEIGHT: 63 IN | HEART RATE: 88 BPM | DIASTOLIC BLOOD PRESSURE: 56 MMHG | TEMPERATURE: 98 F | OXYGEN SATURATION: 95 % | BODY MASS INDEX: 24.98 KG/M2 | SYSTOLIC BLOOD PRESSURE: 113 MMHG | RESPIRATION RATE: 14 BRPM | WEIGHT: 141 LBS

## 2020-09-04 LAB
ALBUMIN SERPL BCP-MCNC: 2.6 G/DL (ref 3.5–5.2)
ALP SERPL-CCNC: 114 U/L (ref 55–135)
ALT SERPL W/O P-5'-P-CCNC: 54 U/L (ref 10–44)
ANION GAP SERPL CALC-SCNC: 7 MMOL/L (ref 8–16)
AST SERPL-CCNC: 77 U/L (ref 10–40)
BASOPHILS # BLD AUTO: 0.05 K/UL (ref 0–0.2)
BASOPHILS NFR BLD: 0.8 % (ref 0–1.9)
BILIRUB SERPL-MCNC: 0.3 MG/DL (ref 0.1–1)
BUN SERPL-MCNC: 7 MG/DL (ref 8–23)
CALCIUM SERPL-MCNC: 8.3 MG/DL (ref 8.7–10.5)
CHLORIDE SERPL-SCNC: 109 MMOL/L (ref 95–110)
CO2 SERPL-SCNC: 23 MMOL/L (ref 23–29)
CREAT SERPL-MCNC: 0.7 MG/DL (ref 0.5–1.4)
DIFFERENTIAL METHOD: ABNORMAL
EOSINOPHIL # BLD AUTO: 0.4 K/UL (ref 0–0.5)
EOSINOPHIL NFR BLD: 5.8 % (ref 0–8)
ERYTHROCYTE [DISTWIDTH] IN BLOOD BY AUTOMATED COUNT: 13.5 % (ref 11.5–14.5)
EST. GFR  (AFRICAN AMERICAN): >60 ML/MIN/1.73 M^2
EST. GFR  (NON AFRICAN AMERICAN): >60 ML/MIN/1.73 M^2
GLUCOSE SERPL-MCNC: 146 MG/DL (ref 70–110)
HCT VFR BLD AUTO: 35.5 % (ref 37–48.5)
HGB BLD-MCNC: 10.8 G/DL (ref 12–16)
IMM GRANULOCYTES # BLD AUTO: 0.03 K/UL (ref 0–0.04)
IMM GRANULOCYTES NFR BLD AUTO: 0.5 % (ref 0–0.5)
LYMPHOCYTES # BLD AUTO: 1.6 K/UL (ref 1–4.8)
LYMPHOCYTES NFR BLD: 24.4 % (ref 18–48)
MAGNESIUM SERPL-MCNC: 1.9 MG/DL (ref 1.6–2.6)
MCH RBC QN AUTO: 26.9 PG (ref 27–31)
MCHC RBC AUTO-ENTMCNC: 30.4 G/DL (ref 32–36)
MCV RBC AUTO: 89 FL (ref 82–98)
MONOCYTES # BLD AUTO: 0.5 K/UL (ref 0.3–1)
MONOCYTES NFR BLD: 7.9 % (ref 4–15)
NEUTROPHILS # BLD AUTO: 4 K/UL (ref 1.8–7.7)
NEUTROPHILS NFR BLD: 60.6 % (ref 38–73)
NRBC BLD-RTO: 0 /100 WBC
PHOSPHATE SERPL-MCNC: 1.8 MG/DL (ref 2.7–4.5)
PLATELET # BLD AUTO: 446 K/UL (ref 150–350)
PMV BLD AUTO: 10.4 FL (ref 9.2–12.9)
POTASSIUM SERPL-SCNC: 4.6 MMOL/L (ref 3.5–5.1)
PROT SERPL-MCNC: 6.2 G/DL (ref 6–8.4)
RBC # BLD AUTO: 4.01 M/UL (ref 4–5.4)
SODIUM SERPL-SCNC: 139 MMOL/L (ref 136–145)
WBC # BLD AUTO: 6.6 K/UL (ref 3.9–12.7)

## 2020-09-04 PROCEDURE — 80053 COMPREHEN METABOLIC PANEL: CPT

## 2020-09-04 PROCEDURE — 99900035 HC TECH TIME PER 15 MIN (STAT)

## 2020-09-04 PROCEDURE — 25000003 PHARM REV CODE 250: Performed by: STUDENT IN AN ORGANIZED HEALTH CARE EDUCATION/TRAINING PROGRAM

## 2020-09-04 PROCEDURE — 85025 COMPLETE CBC W/AUTO DIFF WBC: CPT

## 2020-09-04 PROCEDURE — 94664 DEMO&/EVAL PT USE INHALER: CPT

## 2020-09-04 PROCEDURE — 94760 N-INVAS EAR/PLS OXIMETRY 1: CPT

## 2020-09-04 PROCEDURE — 83735 ASSAY OF MAGNESIUM: CPT

## 2020-09-04 PROCEDURE — 27000646 HC AEROBIKA DEVICE

## 2020-09-04 PROCEDURE — 25000003 PHARM REV CODE 250: Performed by: NURSE PRACTITIONER

## 2020-09-04 PROCEDURE — 63600175 PHARM REV CODE 636 W HCPCS: Performed by: NURSE PRACTITIONER

## 2020-09-04 PROCEDURE — 84100 ASSAY OF PHOSPHORUS: CPT

## 2020-09-04 RX ORDER — ACETAMINOPHEN 325 MG/1
650 TABLET ORAL EVERY 6 HOURS
Qty: 30 TABLET | Refills: 0 | Status: SHIPPED | OUTPATIENT
Start: 2020-09-04 | End: 2020-09-14

## 2020-09-04 RX ORDER — LANOLIN ALCOHOL/MO/W.PET/CERES
800 CREAM (GRAM) TOPICAL ONCE
Status: COMPLETED | OUTPATIENT
Start: 2020-09-04 | End: 2020-09-04

## 2020-09-04 RX ORDER — ENOXAPARIN SODIUM 100 MG/ML
40 INJECTION SUBCUTANEOUS EVERY 24 HOURS
Status: DISCONTINUED | OUTPATIENT
Start: 2020-09-04 | End: 2020-09-04 | Stop reason: HOSPADM

## 2020-09-04 RX ORDER — ACETAMINOPHEN 325 MG/1
650 TABLET ORAL EVERY 6 HOURS
Status: DISCONTINUED | OUTPATIENT
Start: 2020-09-04 | End: 2020-09-04 | Stop reason: HOSPADM

## 2020-09-04 RX ORDER — SODIUM,POTASSIUM PHOSPHATES 280-250MG
2 POWDER IN PACKET (EA) ORAL ONCE
Status: COMPLETED | OUTPATIENT
Start: 2020-09-04 | End: 2020-09-04

## 2020-09-04 RX ORDER — TRAMADOL HYDROCHLORIDE 50 MG/1
50 TABLET ORAL EVERY 4 HOURS PRN
Qty: 15 TABLET | Refills: 0 | Status: SHIPPED | OUTPATIENT
Start: 2020-09-04 | End: 2021-12-27

## 2020-09-04 RX ADMIN — POLYETHYLENE GLYCOL 3350 17 G: 17 POWDER, FOR SOLUTION ORAL at 08:09

## 2020-09-04 RX ADMIN — MAGNESIUM OXIDE 400 MG (241.3 MG MAGNESIUM) TABLET 800 MG: TABLET at 10:09

## 2020-09-04 RX ADMIN — PANTOPRAZOLE SODIUM 40 MG: 40 TABLET, DELAYED RELEASE ORAL at 08:09

## 2020-09-04 RX ADMIN — EZETIMIBE 10 MG: 10 TABLET ORAL at 08:09

## 2020-09-04 RX ADMIN — POTASSIUM & SODIUM PHOSPHATES POWDER PACK 280-160-250 MG 2 PACKET: 280-160-250 PACK at 10:09

## 2020-09-04 RX ADMIN — ACETAMINOPHEN 650 MG: 325 TABLET ORAL at 07:09

## 2020-09-04 RX ADMIN — ENOXAPARIN SODIUM 40 MG: 40 INJECTION SUBCUTANEOUS at 09:09

## 2020-09-04 NOTE — PLAN OF CARE
Patient discharged to home. Discharge instructions verbally given and hard copy provided to patient and daughter. Prescription delivered bedside. Removed 18 g IV with cath tip in place. Patient tolerated IV removal well with bleeding controlled. Patient remains free of falls with no acute pain or distress noted.

## 2020-09-04 NOTE — PLAN OF CARE
POC reviewed. AAOx, VSS on RA, no complaints of SOB, headaches, or dizziness. Urine output as occurrences, independently ambulating to BR. Tolerating full liquid diet. IV fluids at 50 mL/hr, discontinued at end of shift. No BM and no complaints of N/V. Patient denied pain entire evening shift with no pain meds administrated. Resting quietly with side rails up and call light with in reach. Continuing to monitor patient status.

## 2020-09-04 NOTE — DISCHARGE SUMMARY
[facility]  General Surgery  Discharge Summary      Patient Name: Simi Encinas  MRN: [id number]  Admission Date: 9/1/2020  Hospital Length of Stay: 3 days  Discharge Date and Time:  09/04/2020 8:54 AM  Attending Physician: Chris Maldonaod MD   Discharging Provider: Coco Cool NP  Primary Care Provider: Walter Navarro MD     HPI: Patient is a 80 y.o. female presents with a gastric mass found on imaging. She was initially evaluated in the ED at [facility] in July for abdominal pain and nausea and got a RUQUS which demonstrated gallstones along with a mass which on follow-up CT was originating from the lesser curve of the stomach. She states she has had 3 episodes of acute epigastric pain with radiation to her back over the last 2 months. Each episode lasts about 5 hours. Otherwise eating well, denies diarrhea/constipation/melena/hematochezia. Lost 25lbs over last 6 months on account of dieting. Denies fevers/chills. Walks with a cane. Had a similar episode of pain about 1.5 years ago and was attributed to large gallstone in neck of gallbladder.      8/26/20 clinic follow-up after EGD with biopsy for preoperative planning. Feeling well today, no current abdominal pain, no nausea/vomiting, normal BMs, denies fevers/chills/cough/dyspnea.    Procedure(s) (LRB):  XI ROBOTIC GASTRECTOMY (N/A)  CHOLECYSTECTOMY (N/A)     Preop Dx: 1. Gastrointestinal stromal tumor of stomach,  2. Cholelithiasis and chronic cholecystitis  Postop Dx: same  Surgeon: Erica  Assistant: Lucho  Operative Procedure: 1. Resection of GIST tumor of stomach   2. Cholecystectomy  Brief Detail: Robotic, minimally invasive approach. Large (10 cm) GIST tumor of mid stomach. Segmental gastric resection with gastrogastrostomy performed. R0 resection. No metastatic disease. Cholecystectomy performed.     Hospital Course: Ms. Encinas is a 80y F w/ hx of GIST of stomach now s/p robotic gastrectomy.  The patient is tolerating a full  liquid diet.  She is voiding and ambulating without difficulty.  Patient with no complaints of nausea, vomiting, chest pain or shortness of breath.  Her vital signs stable. She is afebrile. She si positive for flatus and positive for bowel sounds.    Physical Exam  Constitutional:       Appearance: Normal appearance. She is normal weight.   HENT:      Head: Normocephalic and atraumatic.      Mouth/Throat:      Mouth: Mucous membranes are moist.   Cardiovascular:      Rate and Rhythm: Normal rate.   Pulmonary:      Effort: Pulmonary effort is normal. No respiratory distress.   Abdominal:      General: Abdomen is flat. There is no distension.      Palpations: Abdomen is soft.      Tenderness: There is abdominal tenderness.      Comments: Minor tenderness. Incision clean, dry, intact.    Skin:     General: Skin is warm and dry.   Neurological:      General: No focal deficit present.      Mental Status: She is alert and oriented to person, place, and time.   Psychiatric:         Mood and Affect: Mood normal.         Behavior: Behavior normal.        Consults:     Significant Diagnostic Studies: Labs:   CMP   Recent Labs   Lab 09/03/20  0343 09/03/20  0553 09/03/20  0940 09/04/20  0447    125* 136 139   K 4.1 3.8  --  4.6    98  --  109   CO2 24 24  --  23   * 109  --  146*   BUN 5* 5*  --  7*   CREATININE 0.7 0.6  --  0.7   CALCIUM 7.8* 7.0*  --  8.3*   PROT 6.0 5.4*  --  6.2   ALBUMIN 2.6* 2.2*  --  2.6*   BILITOT 0.2 0.2  --  0.3   ALKPHOS 92 80  --  114   AST 47* 38  --  77*   ALT 40 33  --  54*   ANIONGAP 7* 3*  --  7*   ESTGFRAFRICA >60.0 >60.0  --  >60.0   EGFRNONAA >60.0 >60.0  --  >60.0    and CBC   Recent Labs   Lab 09/03/20 0343 09/04/20 0447   WBC 8.05 6.60   HGB 9.9* 10.8*   HCT 31.9* 35.5*   * 446*       Pending Diagnostic Studies:     Procedure Component Value Units Date/Time    Specimen to Pathology, Surgery General Surgery [[id number]] Collected: 09/01/20 1335    Order Status:  Sent Lab Status: In process Updated: 09/01/20 1452        Final Active Diagnoses:    Diagnosis Date Noted POA    PRINCIPAL PROBLEM:  Malignant gastrointestinal stromal tumor (GIST) of stomach [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    Specialty: Surgical Oncology  Contact information:  [address]  [facility] [address]  [phone]                 Patient Instructions:      Diet full liquid     Lifting restrictions   Order Comments: No lifting > 10 pounds.  May shower.     Notify your health care provider if you experience any of the following:  temperature >100.4     Notify your health care provider if you experience any of the following:  persistent nausea and vomiting or diarrhea     Notify your health care provider if you experience any of the following:  severe uncontrolled pain     Notify your health care provider if you experience any of the following:  redness, tenderness, or signs of infection (pain, swelling, redness, odor or green/yellow discharge around incision site)     Notify your health care provider if you experience any of the following:  difficulty breathing or increased cough     Notify your health care provider if you experience any of the following:  severe persistent headache     Notify your health care provider if you experience any of the following:  worsening rash     Notify your health care provider if you experience any of the following:  persistent dizziness, light-headedness, or visual disturbances     Notify your health care provider if you experience any of the following:  increased confusion or weakness     Notify your health care provider if you experience any of the following:     No dressing needed     Activity as tolerated      Medications:  Reconciled Home Medications:      Medication List      START taking these medications    acetaminophen 325 MG tablet  Commonly known as: TYLENOL  Take 2 tablets (650 mg total) by mouth every 6 (six) hours.     enoxaparin 40 mg/0.4 mL Syrg  Commonly known as: LOVENOX  Inject 0.4 mLs (40 mg total) into the skin once daily. for 14 days     traMADoL 50 mg tablet  Commonly known as: ULTRAM  Take 1 tablet (50 mg total) by mouth every 4 (four) hours as needed for Pain.        CONTINUE taking these medications    aspirin 81 MG EC tablet  Commonly known as: ECOTRIN  Take 81 mg by mouth 2 (two) times daily.     busPIRone 15 MG tablet  Commonly known as: BUSPAR  Take 15 mg by mouth. PT TAKING 1/3 OF PILL AS  NEEDED 5 MG     ciclopirox 8 % Soln  Commonly known as: PENLAC  Apply topically nightly. Left great toe     CITRACAL + D MAXIMUM 315 mg- 250 unit Tab  Generic drug: calcium citrate-vitamin D3  Take by mouth once daily.     diclofenac sodium 1 % Gel  Commonly known as: VOLTAREN  Apply 1 g topically 4 (four) times daily as needed (To on bilateal knees if needed.).     ezetimibe 10 mg tablet  Commonly known as: ZETIA  Take 10 mg by mouth once daily. allergic to statins     famotidine 20 MG tablet  Commonly known as: PEPCID  Take 20 mg by mouth.     MIRALAX 17 gram/dose powder  Generic drug: polyethylene glycol  Take 17 g by mouth once daily.     multivitamin capsule  Take 1 capsule by mouth once daily. PT TAKING 50+ WOMEN VIT     pantoprazole 40 MG tablet  Commonly known as: PROTONIX  TK 1 T PO QAM     phenylephrine 10 MG Tab  Commonly known as: SUDAFED PE  Take 10 mg by mouth every 4 (four) hours as needed. sinuses     PROLIA 60 mg/mL Syrg  Generic drug: denosumab  Inject 60 mg into the skin every 6 (six) months. PT GET INJECTION EVERY SIX MONTHS.last dose 5/2020     Saccharomyces boulardii 250 mg capsule  Commonly known as: FLORASTOR  Take 100 mg by mouth once daily. Women's probiotics     suvorexant 5 mg  Tab  Commonly known as: BELSOMRA  Take 5 mg by mouth nightly as needed (insomina if needed).     turmeric 400 mg Cap  Take 400 mg by mouth once daily.     VITAMIN C 1000 MG tablet  Generic drug: ascorbic acid (vitamin C)  Take 1,000 mg by mouth once daily.     vitamin D 1000 units Tab  Commonly known as: VITAMIN D3  Take 2,000 Units by mouth once daily.            Coco Cool NP  General Surgery  [facility]

## 2020-09-04 NOTE — PLAN OF CARE
Patient discharged home today with no needs.    Future Appointments   Date Time Provider Department Center   9/14/2020  2:00 PM [facility]          09/04/20 1510   Final Note   Assessment Type Final Discharge Note   Anticipated Discharge Disposition Home   Hospital Follow Up  Appt(s) scheduled? Yes   Right Care Referral Info   Post Acute Recommendation No Care   Post-Acute Status   Other Status No Post-Acute Service Needs

## 2020-09-05 LAB
BLD PROD TYP BPU: NORMAL
BLD PROD TYP BPU: NORMAL
BLOOD UNIT EXPIRATION DATE: NORMAL
BLOOD UNIT EXPIRATION DATE: NORMAL
BLOOD UNIT TYPE CODE: 6200
BLOOD UNIT TYPE CODE: 6200
BLOOD UNIT TYPE: NORMAL
BLOOD UNIT TYPE: NORMAL
CODING SYSTEM: NORMAL
CODING SYSTEM: NORMAL
DISPENSE STATUS: NORMAL
DISPENSE STATUS: NORMAL
TRANS ERYTHROCYTES VOL PATIENT: NORMAL ML
TRANS ERYTHROCYTES VOL PATIENT: NORMAL ML

## 2020-09-14 ENCOUNTER — OFFICE VISIT (OUTPATIENT)
Dept: SURGERY | Facility: CLINIC | Age: 80
End: 2020-09-14
Payer: MEDICARE

## 2020-09-14 ENCOUNTER — TELEPHONE (OUTPATIENT)
Dept: SURGERY | Facility: CLINIC | Age: 80
End: 2020-09-14

## 2020-09-14 VITALS
DIASTOLIC BLOOD PRESSURE: 72 MMHG | HEART RATE: 76 BPM | SYSTOLIC BLOOD PRESSURE: 127 MMHG | RESPIRATION RATE: 18 BRPM | WEIGHT: 140.38 LBS | TEMPERATURE: 99 F | BODY MASS INDEX: 24.87 KG/M2

## 2020-09-14 DIAGNOSIS — C49.A2 MALIGNANT GASTROINTESTINAL STROMAL TUMOR (GIST) OF STOMACH: Primary | ICD-10-CM

## 2020-09-14 PROCEDURE — 99999 PR PBB SHADOW E&M-EST. PATIENT-LVL V: CPT | Mod: PBBFAC,,, | Performed by: NURSE PRACTITIONER

## 2020-09-14 PROCEDURE — 99999 PR PBB SHADOW E&M-EST. PATIENT-LVL V: ICD-10-PCS | Mod: PBBFAC,,, | Performed by: NURSE PRACTITIONER

## 2020-09-14 PROCEDURE — 99024 PR POST-OP FOLLOW-UP VISIT: ICD-10-PCS | Mod: S$GLB,,, | Performed by: NURSE PRACTITIONER

## 2020-09-14 PROCEDURE — 99024 POSTOP FOLLOW-UP VISIT: CPT | Mod: S$GLB,,, | Performed by: NURSE PRACTITIONER

## 2020-09-14 NOTE — LETTER
September 14, 2020        Javier Coats MD  [address] [place] [address] [address]  [address]  [facility]  [place] [address]             [facility]  [address] [facility] [address]  Phone: [phone]   Patient: Simi Encinas   MR Number: [id number]   YOB: 1940   Date of Visit: 9/14/2020       Dear Dr. Coats:    Thank you for referring Simi Encinas to me for evaluation. Attached you will find relevant portions of my assessment and plan of care.    If you have questions, please do not hesitate to call me. I look forward to following Simi Encinas along with you.    Sincerely,      JOSSY Rhodes,ANP-C            CC  No Recipients    Enclosure

## 2020-09-14 NOTE — PROGRESS NOTES
Doing well  Path report: 5.7 cm GIST with negative margins. Low mitotic rate and 20% necrosis. Low risk for recurrence.    Rec:  Needs to see Dr Tomlinson re adjuvant therapy  RTC two weeks    Cc Dr Dyer

## 2020-09-14 NOTE — Clinical Note
Per PHILIP:    Needs to see Dr Tomlinson re adjuvant therapy for stomach GIST  Post op 9/1/2020 surgery so still recovering.

## 2020-09-14 NOTE — PROGRESS NOTES
Post-Op Follow-up Visit:   9/14/2020  Patient ID: Simi Encinas is a 80 y.o. female, born 1940    Chief Complaint   Patient presents with    Post-op Evaluation     patient reports doing well, tolerating oral intake-frequent small meals, denies nausea or vomiting     Interval History: This 81 y/o female returns to clinic accompanied by her daughter in law today. She was admitted 9/1/2020 for robotic gastrectomy with cholecystectomy surgery per Dr. Chris Maldonado. Ms. Encinas was discharged to home on 9/4/2020 on full liquid diet. She report decent appetite, tolerating oral diet. Denies N/V/D. Last BM this morning, taking miralax regularly. Weight stable. Remains active around the house.    Physical Exam:  /72 (BP Location: Right arm, Patient Position: Sitting, BP Method: Medium (Automatic))   Pulse 76   Temp 98.5 °F (36.9 °C) (Oral)   Resp 18   Wt 63.7 kg (140 lb 6.4 oz)   BMI 24.87 kg/m²     General:  Non-toxic, ambulatory  Abd:  Soft, non-tender  Incision:  Multiple abd incisions dermabond intact, without erythema or exudate    Pathology:  Procedure: Partial gastrectomy  - Tumor site: Stomach  - Tumor size: 5.7 cm  - Tumor focality: Unifocal  - Histologic type: Gastrointestinal stromal tumor, spindle cell type  - Mitotic rate: 2/5 millimeter square  Necrosis: Present, 20%  - Histologic grade: Low-grade, mitotic rate less than 5/5 millimeter squared, grade 1  - Risk assessment: Low risk , see comments above  - Margins:  - Uninvolved by GIST  - Distance from closest margin: 2.4 cm from the proximal resection margin  - Regional lymph nodes:  - Number of lymph nodes involved: 0  - Number of lymph nodes examined: 3 (second lymph node dissection with additional of 15 blocks of tissue)  - Pathologic staging: pT3 N0 MX      ICD-10-CM ICD-9-CM    1. Malignant gastrointestinal stromal tumor (GIST) of stomach  C49.A2 151.9 Ambulatory referral/consult to Hematology / Oncology   Plan   Reviewed pathology today  and provided pt with a printed copy.   Advance to soft diet.   Refer to med onc, she requests Dr. Tomlinson, to discuss adj Gleevec and surveillance.   May start driving again.   Stop Lovenox after completing 14 day daily doses.   Recommended continue Prolia as scheduled.   Yes, flu vaccine this fall.      Follow up in about 2 weeks (around 9/28/2020).    Questions were asked and answered to patient's satisfaction.      Pt seen in conjunction with Dr. Chris Maldonado today.           Reshma Man NP  Upper GI / Hepatobiliary Surgical Oncology  [facility] [place] [place]  Office: [phone]  Fax: [phone]

## 2020-09-15 ENCOUNTER — TELEPHONE (OUTPATIENT)
Dept: HEMATOLOGY/ONCOLOGY | Facility: CLINIC | Age: 80
End: 2020-09-15

## 2020-09-25 ENCOUNTER — TELEPHONE (OUTPATIENT)
Dept: SURGERY | Facility: CLINIC | Age: 80
End: 2020-09-25

## 2020-09-25 LAB
FINAL PATHOLOGIC DIAGNOSIS: NORMAL
GROSS: NORMAL
Lab: NORMAL
MICROSCOPIC EXAM: NORMAL
SUPPLEMENTAL DIAGNOSIS: NORMAL

## 2020-09-25 NOTE — PROGRESS NOTES
"  Post-Op Follow-up Visit:   9/28/2020  Patient ID: Simi Encinas is a 80 y.o. female, born 1940    Chief Complaint   Patient presents with    Post-op Evaluation     Interval History: This 81 y/o female returns to clinic accompanied alone today. She was admitted 9/1/2020 for robotic gastrectomy with cholecystectomy surgery per Dr. Chris Maldonado. Ms. Encinas was discharged to home on 9/4/2020 on full liquid diet.   Last seen in clinic 9/14/2020. She report good appetite, tolerating oral diet. Denies N/V/D. Last BM this morning, takes miralax regularly. Weight up to baseline. Remains active around the house.    Physical Exam:  /78 (BP Location: Right arm, Patient Position: Sitting, BP Method: Medium (Automatic))   Pulse 65   Temp 97.7 °F (36.5 °C) (Oral)   Ht 5' 3" (1.6 m)   Wt 64.7 kg (142 lb 9.6 oz)   BMI 25.26 kg/m²     General:  Non-toxic, ambulatory  Abd:  Soft, non-tender  Incision:  Multiple abd incisions dermabond intact, without erythema or exudate    Pathology:  Procedure: Partial gastrectomy  - Tumor site: Stomach  - Tumor size: 5.7 cm  - Tumor focality: Unifocal  - Histologic type: Gastrointestinal stromal tumor, spindle cell type  - Mitotic rate: 2/5 millimeter square  Necrosis: Present, 20%  - Histologic grade: Low-grade, mitotic rate less than 5/5 millimeter squared, grade 1  - Risk assessment: Low risk , see comments above  - Margins:  - Uninvolved by GIST  - Distance from closest margin: 2.4 cm from the proximal resection margin  - Regional lymph nodes:  - Number of lymph nodes involved: 0  - Number of lymph nodes examined: 3 (second lymph node dissection with additional of 15 blocks of tissue)  - Pathologic staging: pT3 N0 MX      ICD-10-CM ICD-9-CM    1. Malignant gastrointestinal stromal tumor (GIST) of stomach  C49.A2 151.9    Plan   Reviewed pathology and printed copy provided to patient.   Advance to regular diet.   May increase physical activity as tolerated but no heavy lifting " >20 # yet. Resume exercise at 6 weeks post op.   Has upcoming appt with Dr. Tomlinson, to discuss adj Gleevec and surveillance on 10/7/2020.      Follow up if symptoms worsen or fail to improve.    Questions were asked and answered to patient's satisfaction.      Pt seen in conjunction with Dr. Chris Maldonado today.           Reshma Man NP  Upper GI / Hepatobiliary Surgical Oncology  [facility] [place] [place]  Office: [phone]  Fax: [phone]

## 2020-09-28 ENCOUNTER — OFFICE VISIT (OUTPATIENT)
Dept: SURGERY | Facility: CLINIC | Age: 80
End: 2020-09-28
Payer: MEDICARE

## 2020-09-28 VITALS
HEART RATE: 65 BPM | DIASTOLIC BLOOD PRESSURE: 78 MMHG | BODY MASS INDEX: 25.27 KG/M2 | WEIGHT: 142.63 LBS | TEMPERATURE: 98 F | HEIGHT: 63 IN | SYSTOLIC BLOOD PRESSURE: 122 MMHG

## 2020-09-28 DIAGNOSIS — C49.A2 MALIGNANT GASTROINTESTINAL STROMAL TUMOR (GIST) OF STOMACH: Primary | ICD-10-CM

## 2020-09-28 PROCEDURE — 99024 PR POST-OP FOLLOW-UP VISIT: ICD-10-PCS | Mod: S$GLB,,, | Performed by: NURSE PRACTITIONER

## 2020-09-28 PROCEDURE — 99999 PR PBB SHADOW E&M-EST. PATIENT-LVL IV: ICD-10-PCS | Mod: PBBFAC,,, | Performed by: NURSE PRACTITIONER

## 2020-09-28 PROCEDURE — 99024 POSTOP FOLLOW-UP VISIT: CPT | Mod: S$GLB,,, | Performed by: NURSE PRACTITIONER

## 2020-09-28 PROCEDURE — 99999 PR PBB SHADOW E&M-EST. PATIENT-LVL IV: CPT | Mod: PBBFAC,,, | Performed by: NURSE PRACTITIONER

## 2020-10-07 ENCOUNTER — OFFICE VISIT (OUTPATIENT)
Dept: HEMATOLOGY/ONCOLOGY | Facility: CLINIC | Age: 80
End: 2020-10-07
Payer: MEDICARE

## 2020-10-07 VITALS
HEART RATE: 69 BPM | OXYGEN SATURATION: 96 % | RESPIRATION RATE: 17 BRPM | WEIGHT: 144.19 LBS | HEIGHT: 63 IN | TEMPERATURE: 98 F | DIASTOLIC BLOOD PRESSURE: 70 MMHG | SYSTOLIC BLOOD PRESSURE: 167 MMHG | BODY MASS INDEX: 25.55 KG/M2

## 2020-10-07 DIAGNOSIS — C49.A2 MALIGNANT GASTROINTESTINAL STROMAL TUMOR (GIST) OF STOMACH: ICD-10-CM

## 2020-10-07 PROCEDURE — 1126F PR PAIN SEVERITY QUANTIFIED, NO PAIN PRESENT: ICD-10-PCS | Mod: S$GLB,,, | Performed by: INTERNAL MEDICINE

## 2020-10-07 PROCEDURE — 1101F PT FALLS ASSESS-DOCD LE1/YR: CPT | Mod: CPTII,S$GLB,, | Performed by: INTERNAL MEDICINE

## 2020-10-07 PROCEDURE — 1101F PR PT FALLS ASSESS DOC 0-1 FALLS W/OUT INJ PAST YR: ICD-10-PCS | Mod: CPTII,S$GLB,, | Performed by: INTERNAL MEDICINE

## 2020-10-07 PROCEDURE — 99205 OFFICE O/P NEW HI 60 MIN: CPT | Mod: S$GLB,,, | Performed by: INTERNAL MEDICINE

## 2020-10-07 PROCEDURE — 99205 PR OFFICE/OUTPT VISIT, NEW, LEVL V, 60-74 MIN: ICD-10-PCS | Mod: S$GLB,,, | Performed by: INTERNAL MEDICINE

## 2020-10-07 PROCEDURE — 1159F MED LIST DOCD IN RCRD: CPT | Mod: S$GLB,,, | Performed by: INTERNAL MEDICINE

## 2020-10-07 PROCEDURE — 1159F PR MEDICATION LIST DOCUMENTED IN MEDICAL RECORD: ICD-10-PCS | Mod: S$GLB,,, | Performed by: INTERNAL MEDICINE

## 2020-10-07 PROCEDURE — 99999 PR PBB SHADOW E&M-EST. PATIENT-LVL V: ICD-10-PCS | Mod: PBBFAC,,, | Performed by: INTERNAL MEDICINE

## 2020-10-07 PROCEDURE — 99999 PR PBB SHADOW E&M-EST. PATIENT-LVL V: CPT | Mod: PBBFAC,,, | Performed by: INTERNAL MEDICINE

## 2020-10-07 PROCEDURE — 1126F AMNT PAIN NOTED NONE PRSNT: CPT | Mod: S$GLB,,, | Performed by: INTERNAL MEDICINE

## 2020-10-07 RX ORDER — ASPIRIN 81 MG/1
81 TABLET ORAL 2 TIMES DAILY
COMMUNITY

## 2020-10-07 NOTE — Clinical Note
Schedule CBC,CMP, CT chest, abdomen/pelvis and see me in early January 2021 on [facility]. Schedule everything on [facility]

## 2020-10-07 NOTE — PROGRESS NOTES
Subjective:       Patient ID: Simi Encinas is a 80 y.o. female.    Chief Complaint: GIST  REFERRING PHYSICIAN: Dr. Chris Maldonado [facility]     HPI Patient is a 80 y.o. female presents with a new diagnosis of GIST. She was initially evaluated in the ED at [facility] in July 2020 for abdominal pain and nausea and underwent a right upper quadrant ultrasound which demonstrated gallstones along with a mass   CT scan on August 2020 revealed a mass in the left upper quadrant measuring approximately 6.5 cm which appears to be arising from the lesser curvature of the gastric body and extending exophytically as detailed above suggestive of a gastrointestinal stromal tumor along with slightly prominent lymph nodes in the gastrohepatic ligament. The pancreas is atrophic but otherwise normal in appearance.  She underwent partial gastrectomy on 9/1/2020 which reveals 5.7 cm unifocal mass along lesser curvature, GIST 2 mitosis/hpf, negative margins and negative nodes  She has recovered well from her surgery and comes in to discuss further management. She is accompanied by her daughter-in-law. ECOG PS is zero    Review of Systems   Constitutional: Negative for appetite change, fatigue and unexpected weight change.   HENT: Negative for mouth sores.    Eyes: Negative for visual disturbance.   Respiratory: Negative for cough and shortness of breath.    Cardiovascular: Negative for chest pain.   Gastrointestinal: Negative for abdominal pain and diarrhea.   Genitourinary: Negative for frequency.   Musculoskeletal: Negative for back pain.   Integumentary:  Negative for rash.   Neurological: Negative for headaches.   Hematological: Negative for adenopathy.   Psychiatric/Behavioral: The patient is not nervous/anxious.    All other systems reviewed and are negative.        Allergies:  Review of patient's allergies indicates:   Allergen Reactions    Statins-hmg-coa reductase inhibitors Other (See Comments)        Medications:  Current Outpatient Medications   Medication Sig Dispense Refill    ascorbic acid, vitamin C, (VITAMIN C) 1000 MG tablet Take 1,000 mg by mouth once daily.       aspirin (ECOTRIN) 81 MG EC tablet Take 81 mg by mouth 2 (two) times a day.      busPIRone (BUSPAR) 15 MG tablet Take 15 mg by mouth. PT TAKING 1/3 OF PILL AS  NEEDED 5 MG      calcium citrate-vitamin D3 (CITRACAL + D MAXIMUM) 315 mg- 250 unit Tab Take by mouth once daily.       ciclopirox (PENLAC) 8 % Soln Apply topically nightly. Left great toe      denosumab (PROLIA) 60 mg/mL Syrg Inject 60 mg into the skin every 6 (six) months. PT GET INJECTION EVERY SIX MONTHS.last dose 5/2020      diclofenac sodium (VOLTAREN) 1 % Gel Apply 1 g topically 4 (four) times daily as needed (To on bilateal knees if needed.).      ezetimibe (ZETIA) 10 mg tablet Take 10 mg by mouth once daily. allergic to statins      multivitamin capsule Take 1 capsule by mouth once daily. PT TAKING 50+ WOMEN VIT      pantoprazole (PROTONIX) 40 MG tablet TK 1 T PO QAM      phenylephrine (SUDAFED PE) 10 MG Tab Take 10 mg by mouth every 4 (four) hours as needed. sinuses      polyethylene glycol (MIRALAX) 17 gram/dose powder Take 17 g by mouth once daily.       Saccharomyces boulardii (FLORASTOR) 250 mg capsule Take 100 mg by mouth once daily. Women's probiotics      suvorexant (BELSOMRA) 5 mg Tab Take 5 mg by mouth nightly as needed (insomina if needed).       turmeric 400 mg Cap Take 400 mg by mouth once daily.       vitamin D (VITAMIN D3) 1000 units Tab Take 2,000 Units by mouth once daily.       aspirin (ECOTRIN) 81 MG EC tablet Take 81 mg by mouth 2 (two) times daily.       traMADoL (ULTRAM) 50 mg tablet Take 1 tablet (50 mg total) by mouth every 4 (four) hours as needed for Pain. (Patient not taking: Reported on 9/14/2020) 15 tablet 0     No current facility-administered medications for this visit.        PMH:  Past Medical History:   Diagnosis Date     Breast CA 1997    RT, lumpectomy    Gastrointestinal stromal tumor (GIST) of stomach 09/01/2020    Hypercholesteremia     Osteoporosis        PSH:  Past Surgical History:   Procedure Laterality Date    bladder lift 2014      BREAST LUMPECTOMY  1997    CATARACT EXTRACTION  2015    CHOLECYSTECTOMY N/A 9/1/2020    Procedure: CHOLECYSTECTOMY;  Surgeon: Chris Maldonado MD;  Location: [address];  Service: General;  Laterality: N/A;    left knee cartlidge removal       OOPHORECTOMY      ROBOT-ASSISTED SURGICAL REMOVAL OF STOMACH USING DA DANDY XI N/A 9/1/2020    Procedure: XI ROBOTIC GASTRECTOMY;  Surgeon: Chris Maldonado MD;  Location: [facility] OR [address] [place] [address];  Service: General;  Laterality: N/A;    SUBTOTAL COLECTOMY  2008       FamHx:  Family History   Problem Relation Age of Onset    Hyperlipidemia Father     Heart attack Father     Heart disease Father     Hypertension Father     Asbestos Father     Hyperlipidemia Mother     Heart disease Mother     Hypertension Mother        SocHx:  Social History     Socioeconomic History    Marital status:      Spouse name: Not on file    Number of children: Not on file    Years of education: Not on file    Highest education level: Not on file   Occupational History    Not on file   Social Needs    Financial resource strain: Not on file    Food insecurity     Worry: Not on file     Inability: Not on file    Transportation needs     Medical: Not on file     Non-medical: Not on file   Tobacco Use    Smoking status: Never Smoker    Smokeless tobacco: Never Used   Substance and Sexual Activity    Alcohol use: Yes     Alcohol/week: 0.0 standard drinks     Comment: occasionally    Drug use: No    Sexual activity: Not on file   Lifestyle    Physical activity     Days per week: Not on file     Minutes per session: Not on file    Stress: Not on file   Relationships    Social connections     Talks on phone: Not on file     Gets together: Not on file      Attends Hindu service: Not on file     Active member of club or organization: Not on file     Attends meetings of clubs or organizations: Not on file     Relationship status: Not on file   Other Topics Concern    Not on file   Social History Narrative    Not on file           Objective:      Physical Exam  Vitals signs reviewed.   Constitutional:       Appearance: She is well-developed.   HENT:      Mouth/Throat:      Pharynx: No oropharyngeal exudate.   Cardiovascular:      Rate and Rhythm: Normal rate.      Heart sounds: Normal heart sounds.   Pulmonary:      Effort: Pulmonary effort is normal.      Breath sounds: Normal breath sounds. No wheezing.   Abdominal:      General: Bowel sounds are normal.      Palpations: Abdomen is soft.      Tenderness: There is no abdominal tenderness.   Musculoskeletal:         General: No tenderness.   Lymphadenopathy:      Cervical: No cervical adenopathy.   Skin:     General: Skin is warm and dry.      Findings: No rash.   Neurological:      Mental Status: She is alert and oriented to person, place, and time.      Coordination: Coordination normal.   Psychiatric:         Thought Content: Thought content normal.         Judgment: Judgment normal.         LABS:  WBC   Date Value Ref Range Status   09/04/2020 6.60 3.90 - 12.70 K/uL Final     Hemoglobin   Date Value Ref Range Status   09/04/2020 10.8 (L) 12.0 - 16.0 g/dL Final     Hematocrit   Date Value Ref Range Status   09/04/2020 35.5 (L) 37.0 - 48.5 % Final     Platelets   Date Value Ref Range Status   09/04/2020 446 (H) 150 - 350 K/uL Final     Gran # (ANC)   Date Value Ref Range Status   09/04/2020 4.0 1.8 - 7.7 K/uL Final     Gran%   Date Value Ref Range Status   09/04/2020 60.6 38.0 - 73.0 % Final       Chemistry        Component Value Date/Time     09/04/2020 0447    K 4.6 09/04/2020 0447     09/04/2020 0447    CO2 23 09/04/2020 0447    BUN 7 (L) 09/04/2020 0447    CREATININE 0.7 09/04/2020 0447    GLU  146 (H) 09/04/2020 0447        Component Value Date/Time    CALCIUM 8.3 (L) 09/04/2020 0447    ALKPHOS 114 09/04/2020 0447    AST 77 (H) 09/04/2020 0447    ALT 54 (H) 09/04/2020 0447    BILITOT 0.3 09/04/2020 0447    ESTGFRAFRICA >60.0 09/04/2020 0447    EGFRNONAA >60.0 09/04/2020 0447           Assessment:       1. Malignant gastrointestinal stromal tumor (GIST) of stomach        Plan:        1. Reviewed pathology extensively. She has stomach GIST, exon 11 which is 5.7 cm in size, 2 mitosis/hpf. Per [facility] nomogram her Recurrence Free Survival with surgery alone is 95% at 2 years and 90% at 5 years. Also her risk of metastasis with current size, location and mitosis (less than 5) is 3.6%, hence does not need Gleevac as benefit would be minimal at this time. However she will be followed with CT scans every 6 months, if any new findings will plan on biopsy and treatment accordingly.    She understands the rationale and is agreeable with the plan    Above care plan was discussed with patient and accompanying daughter-in-law and all questions were addressed to their satisfaction

## 2020-10-07 NOTE — LETTER
October 7, 2020      Chris Maldonado MD  [address]  [facility] [address]           [facility] 3rd Fl  [address]  [facility] [address]  Phone: [phone]          Patient: Simi Encinas   MR Number: [id number]   YOB: 1940   Date of Visit: 10/7/2020       Dear Dr. Chris Maldonado:    Thank you for referring Simi Encinas to me for evaluation. Attached you will find relevant portions of my assessment and plan of care.    If you have questions, please do not hesitate to call me. I look forward to following Simi Encinas along with you.    Sincerely,    Naheed Tomlinson MD    Enclosure  CC:  No Recipients    If you would like to receive this communication electronically, please contact [email or web address] or [phone] to request more information on [employer] access.    For providers and/or their staff who would like to refer a patient to [facility], please contact us through our one-stop-shop provider referral line, [facility], at [phone].    If you feel you have received this communication in error or would no longer like to receive these types of communications, please e-mail [email or web address]

## 2021-01-05 ENCOUNTER — IMMUNIZATION (OUTPATIENT)
Dept: OBSTETRICS AND GYNECOLOGY | Facility: CLINIC | Age: 81
End: 2021-01-05
Payer: MEDICARE

## 2021-01-05 DIAGNOSIS — Z23 NEED FOR VACCINATION: ICD-10-CM

## 2021-01-05 PROCEDURE — 91300 COVID-19, MRNA, LNP-S, PF, 30 MCG/0.3 ML DOSE VACCINE: CPT | Mod: PBBFAC | Performed by: FAMILY MEDICINE

## 2021-01-08 ENCOUNTER — TELEPHONE (OUTPATIENT)
Dept: HEMATOLOGY/ONCOLOGY | Facility: CLINIC | Age: 81
End: 2021-01-08

## 2021-01-26 ENCOUNTER — IMMUNIZATION (OUTPATIENT)
Dept: OBSTETRICS AND GYNECOLOGY | Facility: CLINIC | Age: 81
End: 2021-01-26
Payer: MEDICARE

## 2021-01-26 DIAGNOSIS — Z23 NEED FOR VACCINATION: Primary | ICD-10-CM

## 2021-01-26 PROCEDURE — 0002A COVID-19, MRNA, LNP-S, PF, 30 MCG/0.3 ML DOSE VACCINE: CPT | Mod: PBBFAC | Performed by: FAMILY MEDICINE

## 2021-01-26 PROCEDURE — 91300 COVID-19, MRNA, LNP-S, PF, 30 MCG/0.3 ML DOSE VACCINE: CPT | Mod: PBBFAC | Performed by: FAMILY MEDICINE

## 2021-02-08 ENCOUNTER — HOSPITAL ENCOUNTER (OUTPATIENT)
Dept: RADIOLOGY | Facility: HOSPITAL | Age: 81
Discharge: HOME | End: 2021-02-08
Attending: INTERNAL MEDICINE
Payer: MEDICARE

## 2021-02-08 DIAGNOSIS — C49.A2 MALIGNANT GASTROINTESTINAL STROMAL TUMOR (GIST) OF STOMACH: ICD-10-CM

## 2021-02-08 PROCEDURE — 25500020 PHARM REV CODE 255: Performed by: INTERNAL MEDICINE

## 2021-02-08 PROCEDURE — 71260 CT THORAX DX C+: CPT | Mod: TC

## 2021-02-08 PROCEDURE — 71260 CT CHEST ABDOMEN PELVIS WITH CONTRAST (XPD): ICD-10-PCS | Mod: 26,,, | Performed by: RADIOLOGY

## 2021-02-08 PROCEDURE — 74177 CT ABD & PELVIS W/CONTRAST: CPT | Mod: 26,,, | Performed by: RADIOLOGY

## 2021-02-08 PROCEDURE — 74177 CT ABD & PELVIS W/CONTRAST: CPT | Mod: TC

## 2021-02-08 PROCEDURE — 74177 CT CHEST ABDOMEN PELVIS WITH CONTRAST (XPD): ICD-10-PCS | Mod: 26,,, | Performed by: RADIOLOGY

## 2021-02-08 PROCEDURE — 71260 CT THORAX DX C+: CPT | Mod: 26,,, | Performed by: RADIOLOGY

## 2021-02-08 RX ADMIN — IOHEXOL 75 ML: 350 INJECTION, SOLUTION INTRAVENOUS at 09:02

## 2021-02-08 RX ADMIN — IOHEXOL 15 ML: 300 INJECTION, SOLUTION INTRAVENOUS at 09:02

## 2021-02-09 ENCOUNTER — TELEPHONE (OUTPATIENT)
Dept: HEMATOLOGY/ONCOLOGY | Facility: CLINIC | Age: 81
End: 2021-02-09

## 2021-02-09 ENCOUNTER — OFFICE VISIT (OUTPATIENT)
Dept: HEMATOLOGY/ONCOLOGY | Facility: CLINIC | Age: 81
End: 2021-02-09
Payer: MEDICARE

## 2021-02-09 VITALS
OXYGEN SATURATION: 93 % | SYSTOLIC BLOOD PRESSURE: 119 MMHG | HEART RATE: 77 BPM | HEIGHT: 63 IN | WEIGHT: 145.19 LBS | BODY MASS INDEX: 25.73 KG/M2 | DIASTOLIC BLOOD PRESSURE: 72 MMHG

## 2021-02-09 DIAGNOSIS — E04.1 THYROID NODULE: Primary | ICD-10-CM

## 2021-02-09 DIAGNOSIS — Z85.09 HISTORY OF GASTROINTESTINAL STROMAL TUMOR (GIST): ICD-10-CM

## 2021-02-09 DIAGNOSIS — Z85.09 HISTORY OF GASTROINTESTINAL STROMAL TUMOR (GIST): Primary | ICD-10-CM

## 2021-02-09 PROCEDURE — 1126F PR PAIN SEVERITY QUANTIFIED, NO PAIN PRESENT: ICD-10-PCS | Mod: S$GLB,,, | Performed by: INTERNAL MEDICINE

## 2021-02-09 PROCEDURE — 1159F MED LIST DOCD IN RCRD: CPT | Mod: S$GLB,,, | Performed by: INTERNAL MEDICINE

## 2021-02-09 PROCEDURE — 1126F AMNT PAIN NOTED NONE PRSNT: CPT | Mod: S$GLB,,, | Performed by: INTERNAL MEDICINE

## 2021-02-09 PROCEDURE — 1101F PR PT FALLS ASSESS DOC 0-1 FALLS W/OUT INJ PAST YR: ICD-10-PCS | Mod: CPTII,S$GLB,, | Performed by: INTERNAL MEDICINE

## 2021-02-09 PROCEDURE — 99999 PR PBB SHADOW E&M-EST. PATIENT-LVL IV: CPT | Mod: PBBFAC,,, | Performed by: INTERNAL MEDICINE

## 2021-02-09 PROCEDURE — 99214 PR OFFICE/OUTPT VISIT, EST, LEVL IV, 30-39 MIN: ICD-10-PCS | Mod: S$GLB,,, | Performed by: INTERNAL MEDICINE

## 2021-02-09 PROCEDURE — 3288F FALL RISK ASSESSMENT DOCD: CPT | Mod: CPTII,S$GLB,, | Performed by: INTERNAL MEDICINE

## 2021-02-09 PROCEDURE — 1159F PR MEDICATION LIST DOCUMENTED IN MEDICAL RECORD: ICD-10-PCS | Mod: S$GLB,,, | Performed by: INTERNAL MEDICINE

## 2021-02-09 PROCEDURE — 99999 PR PBB SHADOW E&M-EST. PATIENT-LVL IV: ICD-10-PCS | Mod: PBBFAC,,, | Performed by: INTERNAL MEDICINE

## 2021-02-09 PROCEDURE — 1101F PT FALLS ASSESS-DOCD LE1/YR: CPT | Mod: CPTII,S$GLB,, | Performed by: INTERNAL MEDICINE

## 2021-02-09 PROCEDURE — 99214 OFFICE O/P EST MOD 30 MIN: CPT | Mod: S$GLB,,, | Performed by: INTERNAL MEDICINE

## 2021-02-09 PROCEDURE — 3288F PR FALLS RISK ASSESSMENT DOCUMENTED: ICD-10-PCS | Mod: CPTII,S$GLB,, | Performed by: INTERNAL MEDICINE

## 2021-02-15 ENCOUNTER — HOSPITAL ENCOUNTER (OUTPATIENT)
Dept: RADIOLOGY | Facility: HOSPITAL | Age: 81
Discharge: HOME | End: 2021-02-15
Attending: INTERNAL MEDICINE
Payer: MEDICARE

## 2021-02-15 DIAGNOSIS — E04.1 THYROID NODULE: ICD-10-CM

## 2021-02-15 PROCEDURE — 76536 US SOFT TISSUE HEAD NECK THYROID: ICD-10-PCS | Mod: 26,,, | Performed by: RADIOLOGY

## 2021-02-15 PROCEDURE — 76536 US EXAM OF HEAD AND NECK: CPT | Mod: TC

## 2021-02-15 PROCEDURE — 76536 US EXAM OF HEAD AND NECK: CPT | Mod: 26,,, | Performed by: RADIOLOGY

## 2021-02-17 ENCOUNTER — TELEPHONE (OUTPATIENT)
Dept: HEMATOLOGY/ONCOLOGY | Facility: CLINIC | Age: 81
End: 2021-02-17

## 2021-02-17 DIAGNOSIS — E04.1 THYROID NODULE: Primary | ICD-10-CM

## 2021-02-18 ENCOUNTER — TELEPHONE (OUTPATIENT)
Dept: HEMATOLOGY/ONCOLOGY | Facility: CLINIC | Age: 81
End: 2021-02-18

## 2021-02-20 ENCOUNTER — PATIENT MESSAGE (OUTPATIENT)
Dept: HEMATOLOGY/ONCOLOGY | Facility: CLINIC | Age: 81
End: 2021-02-20

## 2021-02-25 ENCOUNTER — HOSPITAL ENCOUNTER (OUTPATIENT)
Dept: INTERVENTIONAL RADIOLOGY/VASCULAR | Facility: HOSPITAL | Age: 81
Discharge: HOME | End: 2021-02-25
Attending: INTERNAL MEDICINE
Payer: MEDICARE

## 2021-02-25 DIAGNOSIS — E04.1 THYROID NODULE: ICD-10-CM

## 2021-02-25 PROCEDURE — 88177 PR  EVALUATION OF FNA SMEAR TO DETERMINE ADEQUACY, EA ADD EVAL: ICD-10-PCS | Mod: 26,,, | Performed by: PATHOLOGY

## 2021-02-25 PROCEDURE — 88177 CYTP FNA EVAL EA ADDL: CPT | Performed by: PATHOLOGY

## 2021-02-25 PROCEDURE — 88172 CYTP DX EVAL FNA 1ST EA SITE: CPT | Mod: 26,,, | Performed by: PATHOLOGY

## 2021-02-25 PROCEDURE — 10005 FNA BX W/US GDN 1ST LES: CPT | Mod: RT,,, | Performed by: RADIOLOGY

## 2021-02-25 PROCEDURE — 88177 CYTP FNA EVAL EA ADDL: CPT | Mod: 26,,, | Performed by: PATHOLOGY

## 2021-02-25 PROCEDURE — 88173 CYTOPATH EVAL FNA REPORT: CPT | Performed by: PATHOLOGY

## 2021-02-25 PROCEDURE — 88173 PR  INTERPRETATION OF FNA SMEAR: ICD-10-PCS | Mod: 26,,, | Performed by: PATHOLOGY

## 2021-02-25 PROCEDURE — 10005 IR US FINE NEEDLE ASPIRATION BIOPSY, FIRST LESION: ICD-10-PCS | Mod: RT,,, | Performed by: RADIOLOGY

## 2021-02-25 PROCEDURE — 10005 FNA BX W/US GDN 1ST LES: CPT

## 2021-02-25 PROCEDURE — 88173 CYTOPATH EVAL FNA REPORT: CPT | Mod: 26,,, | Performed by: PATHOLOGY

## 2021-02-25 PROCEDURE — 88172 CYTP DX EVAL FNA 1ST EA SITE: CPT | Performed by: PATHOLOGY

## 2021-02-25 PROCEDURE — 88172 PR  EVALUATION OF FNA SMEAR TO DETERMINE ADEQUACY, FIRST EVAL: ICD-10-PCS | Mod: 26,,, | Performed by: PATHOLOGY

## 2021-02-25 PROCEDURE — A4215 STERILE NEEDLE: HCPCS

## 2021-03-01 ENCOUNTER — TELEPHONE (OUTPATIENT)
Dept: HEMATOLOGY/ONCOLOGY | Facility: CLINIC | Age: 81
End: 2021-03-01

## 2021-03-01 LAB — FINAL PATHOLOGIC DIAGNOSIS: NORMAL

## 2021-05-11 ENCOUNTER — PATIENT MESSAGE (OUTPATIENT)
Dept: HEMATOLOGY/ONCOLOGY | Facility: CLINIC | Age: 81
End: 2021-05-11

## 2021-05-14 ENCOUNTER — HOSPITAL ENCOUNTER (OUTPATIENT)
Dept: RADIOLOGY | Facility: HOSPITAL | Age: 81
Discharge: HOME | End: 2021-05-14
Attending: INTERNAL MEDICINE
Payer: MEDICARE

## 2021-05-14 DIAGNOSIS — Z85.09 HISTORY OF GASTROINTESTINAL STROMAL TUMOR (GIST): ICD-10-CM

## 2021-05-14 PROCEDURE — 74177 CT ABD & PELVIS W/CONTRAST: CPT | Mod: TC

## 2021-05-14 PROCEDURE — 25500020 PHARM REV CODE 255: Performed by: INTERNAL MEDICINE

## 2021-05-14 PROCEDURE — 71260 CT THORAX DX C+: CPT | Mod: 26,,, | Performed by: RADIOLOGY

## 2021-05-14 PROCEDURE — 71260 CT THORAX DX C+: CPT | Mod: TC

## 2021-05-14 PROCEDURE — 74177 CT ABD & PELVIS W/CONTRAST: CPT | Mod: 26,,, | Performed by: RADIOLOGY

## 2021-05-14 PROCEDURE — 74177 CT CHEST ABDOMEN PELVIS WITH CONTRAST (XPD): ICD-10-PCS | Mod: 26,,, | Performed by: RADIOLOGY

## 2021-05-14 PROCEDURE — 71260 CT CHEST ABDOMEN PELVIS WITH CONTRAST (XPD): ICD-10-PCS | Mod: 26,,, | Performed by: RADIOLOGY

## 2021-05-14 RX ADMIN — IOHEXOL 75 ML: 350 INJECTION, SOLUTION INTRAVENOUS at 10:05

## 2021-05-14 RX ADMIN — IOHEXOL 15 ML: 300 INJECTION, SOLUTION INTRAVENOUS at 08:05

## 2021-05-18 ENCOUNTER — OFFICE VISIT (OUTPATIENT)
Dept: HEMATOLOGY/ONCOLOGY | Facility: CLINIC | Age: 81
End: 2021-05-18
Payer: MEDICARE

## 2021-05-18 VITALS
SYSTOLIC BLOOD PRESSURE: 135 MMHG | HEART RATE: 89 BPM | OXYGEN SATURATION: 93 % | WEIGHT: 147.69 LBS | BODY MASS INDEX: 26.17 KG/M2 | TEMPERATURE: 98 F | DIASTOLIC BLOOD PRESSURE: 60 MMHG

## 2021-05-18 DIAGNOSIS — Z85.09 HISTORY OF GASTROINTESTINAL STROMAL TUMOR (GIST): Primary | ICD-10-CM

## 2021-05-18 PROCEDURE — 99213 PR OFFICE/OUTPT VISIT, EST, LEVL III, 20-29 MIN: ICD-10-PCS | Mod: S$GLB,,, | Performed by: INTERNAL MEDICINE

## 2021-05-18 PROCEDURE — 99999 PR PBB SHADOW E&M-EST. PATIENT-LVL II: ICD-10-PCS | Mod: PBBFAC,,, | Performed by: INTERNAL MEDICINE

## 2021-05-18 PROCEDURE — 99999 PR PBB SHADOW E&M-EST. PATIENT-LVL II: CPT | Mod: PBBFAC,,, | Performed by: INTERNAL MEDICINE

## 2021-05-18 PROCEDURE — 1159F MED LIST DOCD IN RCRD: CPT | Mod: S$GLB,,, | Performed by: INTERNAL MEDICINE

## 2021-05-18 PROCEDURE — 99213 OFFICE O/P EST LOW 20 MIN: CPT | Mod: S$GLB,,, | Performed by: INTERNAL MEDICINE

## 2021-05-18 PROCEDURE — 1159F PR MEDICATION LIST DOCUMENTED IN MEDICAL RECORD: ICD-10-PCS | Mod: S$GLB,,, | Performed by: INTERNAL MEDICINE

## 2021-08-17 ENCOUNTER — IMMUNIZATION (OUTPATIENT)
Dept: OBSTETRICS AND GYNECOLOGY | Facility: CLINIC | Age: 81
End: 2021-08-17
Payer: MEDICARE

## 2021-08-17 DIAGNOSIS — Z23 NEED FOR VACCINATION: Primary | ICD-10-CM

## 2021-08-17 PROCEDURE — 91300 COVID-19, MRNA, LNP-S, PF, 30 MCG/0.3 ML DOSE VACCINE: ICD-10-PCS | Mod: ,,, | Performed by: FAMILY MEDICINE

## 2021-08-17 PROCEDURE — 91300 COVID-19, MRNA, LNP-S, PF, 30 MCG/0.3 ML DOSE VACCINE: CPT | Mod: ,,, | Performed by: FAMILY MEDICINE

## 2021-08-17 PROCEDURE — 0003A COVID-19, MRNA, LNP-S, PF, 30 MCG/0.3 ML DOSE VACCINE: CPT | Mod: CV19,,, | Performed by: FAMILY MEDICINE

## 2021-08-17 PROCEDURE — 0003A COVID-19, MRNA, LNP-S, PF, 30 MCG/0.3 ML DOSE VACCINE: ICD-10-PCS | Mod: CV19,,, | Performed by: FAMILY MEDICINE

## 2021-10-19 DIAGNOSIS — Z85.09 HISTORY OF GASTROINTESTINAL STROMAL TUMOR (GIST): Primary | ICD-10-CM

## 2021-11-03 ENCOUNTER — PATIENT MESSAGE (OUTPATIENT)
Dept: HEMATOLOGY/ONCOLOGY | Facility: CLINIC | Age: 81
End: 2021-11-03
Payer: MEDICARE

## 2021-11-12 ENCOUNTER — HOSPITAL ENCOUNTER (OUTPATIENT)
Dept: RADIOLOGY | Facility: HOSPITAL | Age: 81
Discharge: HOME | End: 2021-11-12
Attending: INTERNAL MEDICINE
Payer: MEDICARE

## 2021-11-12 DIAGNOSIS — Z85.09 HISTORY OF GASTROINTESTINAL STROMAL TUMOR (GIST): ICD-10-CM

## 2021-11-12 PROCEDURE — 71260 CT CHEST ABDOMEN PELVIS WITH CONTRAST (XPD): ICD-10-PCS | Mod: 26,,, | Performed by: RADIOLOGY

## 2021-11-12 PROCEDURE — 25500020 PHARM REV CODE 255: Performed by: INTERNAL MEDICINE

## 2021-11-12 PROCEDURE — 74177 CT ABD & PELVIS W/CONTRAST: CPT | Mod: 26,,, | Performed by: RADIOLOGY

## 2021-11-12 PROCEDURE — 71260 CT THORAX DX C+: CPT | Mod: TC

## 2021-11-12 PROCEDURE — 74177 CT ABD & PELVIS W/CONTRAST: CPT | Mod: TC

## 2021-11-12 PROCEDURE — 74177 CT CHEST ABDOMEN PELVIS WITH CONTRAST (XPD): ICD-10-PCS | Mod: 26,,, | Performed by: RADIOLOGY

## 2021-11-12 PROCEDURE — 71260 CT THORAX DX C+: CPT | Mod: 26,,, | Performed by: RADIOLOGY

## 2021-11-12 RX ADMIN — IOHEXOL 15 ML: 300 INJECTION, SOLUTION INTRAVENOUS at 08:11

## 2021-11-12 RX ADMIN — IOHEXOL 75 ML: 350 INJECTION, SOLUTION INTRAVENOUS at 09:11

## 2021-11-17 ENCOUNTER — OFFICE VISIT (OUTPATIENT)
Dept: HEMATOLOGY/ONCOLOGY | Facility: CLINIC | Age: 81
End: 2021-11-17
Payer: MEDICARE

## 2021-11-17 VITALS
TEMPERATURE: 98 F | BODY MASS INDEX: 28.47 KG/M2 | SYSTOLIC BLOOD PRESSURE: 147 MMHG | RESPIRATION RATE: 16 BRPM | HEIGHT: 63 IN | DIASTOLIC BLOOD PRESSURE: 80 MMHG | OXYGEN SATURATION: 98 % | WEIGHT: 160.69 LBS | HEART RATE: 76 BPM

## 2021-11-17 DIAGNOSIS — Z85.09 HISTORY OF GASTROINTESTINAL STROMAL TUMOR (GIST): Primary | ICD-10-CM

## 2021-11-17 PROCEDURE — 1101F PR PT FALLS ASSESS DOC 0-1 FALLS W/OUT INJ PAST YR: ICD-10-PCS | Mod: CPTII,S$GLB,, | Performed by: INTERNAL MEDICINE

## 2021-11-17 PROCEDURE — 3079F DIAST BP 80-89 MM HG: CPT | Mod: CPTII,S$GLB,, | Performed by: INTERNAL MEDICINE

## 2021-11-17 PROCEDURE — 3288F FALL RISK ASSESSMENT DOCD: CPT | Mod: CPTII,S$GLB,, | Performed by: INTERNAL MEDICINE

## 2021-11-17 PROCEDURE — 3288F PR FALLS RISK ASSESSMENT DOCUMENTED: ICD-10-PCS | Mod: CPTII,S$GLB,, | Performed by: INTERNAL MEDICINE

## 2021-11-17 PROCEDURE — 99999 PR PBB SHADOW E&M-EST. PATIENT-LVL IV: CPT | Mod: PBBFAC,,, | Performed by: INTERNAL MEDICINE

## 2021-11-17 PROCEDURE — 1159F PR MEDICATION LIST DOCUMENTED IN MEDICAL RECORD: ICD-10-PCS | Mod: CPTII,S$GLB,, | Performed by: INTERNAL MEDICINE

## 2021-11-17 PROCEDURE — 1101F PT FALLS ASSESS-DOCD LE1/YR: CPT | Mod: CPTII,S$GLB,, | Performed by: INTERNAL MEDICINE

## 2021-11-17 PROCEDURE — 1126F AMNT PAIN NOTED NONE PRSNT: CPT | Mod: CPTII,S$GLB,, | Performed by: INTERNAL MEDICINE

## 2021-11-17 PROCEDURE — 3077F SYST BP >= 140 MM HG: CPT | Mod: CPTII,S$GLB,, | Performed by: INTERNAL MEDICINE

## 2021-11-17 PROCEDURE — 1159F MED LIST DOCD IN RCRD: CPT | Mod: CPTII,S$GLB,, | Performed by: INTERNAL MEDICINE

## 2021-11-17 PROCEDURE — 3079F PR MOST RECENT DIASTOLIC BLOOD PRESSURE 80-89 MM HG: ICD-10-PCS | Mod: CPTII,S$GLB,, | Performed by: INTERNAL MEDICINE

## 2021-11-17 PROCEDURE — 99214 PR OFFICE/OUTPT VISIT, EST, LEVL IV, 30-39 MIN: ICD-10-PCS | Mod: S$GLB,,, | Performed by: INTERNAL MEDICINE

## 2021-11-17 PROCEDURE — 99999 PR PBB SHADOW E&M-EST. PATIENT-LVL IV: ICD-10-PCS | Mod: PBBFAC,,, | Performed by: INTERNAL MEDICINE

## 2021-11-17 PROCEDURE — 3077F PR MOST RECENT SYSTOLIC BLOOD PRESSURE >= 140 MM HG: ICD-10-PCS | Mod: CPTII,S$GLB,, | Performed by: INTERNAL MEDICINE

## 2021-11-17 PROCEDURE — 1126F PR PAIN SEVERITY QUANTIFIED, NO PAIN PRESENT: ICD-10-PCS | Mod: CPTII,S$GLB,, | Performed by: INTERNAL MEDICINE

## 2021-11-17 PROCEDURE — 99214 OFFICE O/P EST MOD 30 MIN: CPT | Mod: S$GLB,,, | Performed by: INTERNAL MEDICINE

## 2021-11-22 ENCOUNTER — PATIENT MESSAGE (OUTPATIENT)
Dept: HEMATOLOGY/ONCOLOGY | Facility: CLINIC | Age: 81
End: 2021-11-22
Payer: MEDICARE

## 2021-11-29 ENCOUNTER — TELEPHONE (OUTPATIENT)
Dept: ENDOSCOPY | Facility: HOSPITAL | Age: 81
End: 2021-11-29
Payer: MEDICARE

## 2021-12-01 ENCOUNTER — PATIENT MESSAGE (OUTPATIENT)
Dept: ENDOSCOPY | Facility: HOSPITAL | Age: 81
End: 2021-12-01
Payer: MEDICARE

## 2021-12-02 ENCOUNTER — PATIENT MESSAGE (OUTPATIENT)
Dept: HEMATOLOGY/ONCOLOGY | Facility: CLINIC | Age: 81
End: 2021-12-02
Payer: MEDICARE

## 2021-12-09 ENCOUNTER — PATIENT MESSAGE (OUTPATIENT)
Dept: ENDOSCOPY | Facility: HOSPITAL | Age: 81
End: 2021-12-09
Payer: MEDICARE

## 2021-12-27 ENCOUNTER — OFFICE VISIT (OUTPATIENT)
Dept: ENDOCRINOLOGY | Facility: CLINIC | Age: 81
End: 2021-12-27
Payer: MEDICARE

## 2021-12-27 VITALS
WEIGHT: 164.81 LBS | HEIGHT: 63 IN | BODY MASS INDEX: 29.2 KG/M2 | SYSTOLIC BLOOD PRESSURE: 146 MMHG | HEART RATE: 57 BPM | DIASTOLIC BLOOD PRESSURE: 90 MMHG | OXYGEN SATURATION: 97 %

## 2021-12-27 DIAGNOSIS — E16.2 HYPOGLYCEMIA: Primary | ICD-10-CM

## 2021-12-27 DIAGNOSIS — Z85.09 HISTORY OF GASTROINTESTINAL STROMAL TUMOR (GIST): ICD-10-CM

## 2021-12-27 PROCEDURE — 99999 PR PBB SHADOW E&M-EST. PATIENT-LVL IV: CPT | Mod: PBBFAC,,, | Performed by: INTERNAL MEDICINE

## 2021-12-27 PROCEDURE — 1101F PT FALLS ASSESS-DOCD LE1/YR: CPT | Mod: CPTII,S$GLB,, | Performed by: INTERNAL MEDICINE

## 2021-12-27 PROCEDURE — 1159F MED LIST DOCD IN RCRD: CPT | Mod: CPTII,S$GLB,, | Performed by: INTERNAL MEDICINE

## 2021-12-27 PROCEDURE — 1159F PR MEDICATION LIST DOCUMENTED IN MEDICAL RECORD: ICD-10-PCS | Mod: CPTII,S$GLB,, | Performed by: INTERNAL MEDICINE

## 2021-12-27 PROCEDURE — 1126F PR PAIN SEVERITY QUANTIFIED, NO PAIN PRESENT: ICD-10-PCS | Mod: CPTII,S$GLB,, | Performed by: INTERNAL MEDICINE

## 2021-12-27 PROCEDURE — 3288F PR FALLS RISK ASSESSMENT DOCUMENTED: ICD-10-PCS | Mod: CPTII,S$GLB,, | Performed by: INTERNAL MEDICINE

## 2021-12-27 PROCEDURE — 1126F AMNT PAIN NOTED NONE PRSNT: CPT | Mod: CPTII,S$GLB,, | Performed by: INTERNAL MEDICINE

## 2021-12-27 PROCEDURE — 1160F RVW MEDS BY RX/DR IN RCRD: CPT | Mod: CPTII,S$GLB,, | Performed by: INTERNAL MEDICINE

## 2021-12-27 PROCEDURE — 99999 PR PBB SHADOW E&M-EST. PATIENT-LVL IV: ICD-10-PCS | Mod: PBBFAC,,, | Performed by: INTERNAL MEDICINE

## 2021-12-27 PROCEDURE — 1101F PR PT FALLS ASSESS DOC 0-1 FALLS W/OUT INJ PAST YR: ICD-10-PCS | Mod: CPTII,S$GLB,, | Performed by: INTERNAL MEDICINE

## 2021-12-27 PROCEDURE — 3077F SYST BP >= 140 MM HG: CPT | Mod: CPTII,S$GLB,, | Performed by: INTERNAL MEDICINE

## 2021-12-27 PROCEDURE — 3288F FALL RISK ASSESSMENT DOCD: CPT | Mod: CPTII,S$GLB,, | Performed by: INTERNAL MEDICINE

## 2021-12-27 PROCEDURE — 1160F PR REVIEW ALL MEDS BY PRESCRIBER/CLIN PHARMACIST DOCUMENTED: ICD-10-PCS | Mod: CPTII,S$GLB,, | Performed by: INTERNAL MEDICINE

## 2021-12-27 PROCEDURE — 3080F PR MOST RECENT DIASTOLIC BLOOD PRESSURE >= 90 MM HG: ICD-10-PCS | Mod: CPTII,S$GLB,, | Performed by: INTERNAL MEDICINE

## 2021-12-27 PROCEDURE — 3080F DIAST BP >= 90 MM HG: CPT | Mod: CPTII,S$GLB,, | Performed by: INTERNAL MEDICINE

## 2021-12-27 PROCEDURE — 99204 OFFICE O/P NEW MOD 45 MIN: CPT | Mod: S$GLB,,, | Performed by: INTERNAL MEDICINE

## 2021-12-27 PROCEDURE — 99204 PR OFFICE/OUTPT VISIT, NEW, LEVL IV, 45-59 MIN: ICD-10-PCS | Mod: S$GLB,,, | Performed by: INTERNAL MEDICINE

## 2021-12-27 PROCEDURE — 3077F PR MOST RECENT SYSTOLIC BLOOD PRESSURE >= 140 MM HG: ICD-10-PCS | Mod: CPTII,S$GLB,, | Performed by: INTERNAL MEDICINE

## 2021-12-27 RX ORDER — LANCETS
EACH MISCELLANEOUS
Qty: 360 EACH | Refills: 3 | Status: SHIPPED | OUTPATIENT
Start: 2021-12-27

## 2022-01-07 ENCOUNTER — TELEPHONE (OUTPATIENT)
Dept: ENDOCRINOLOGY | Facility: CLINIC | Age: 82
End: 2022-01-07
Payer: MEDICARE

## 2022-01-07 ENCOUNTER — PATIENT MESSAGE (OUTPATIENT)
Dept: ENDOCRINOLOGY | Facility: CLINIC | Age: 82
End: 2022-01-07
Payer: MEDICARE

## 2022-01-07 NOTE — TELEPHONE ENCOUNTER
----- Message from Ranjeet Serrano sent at 1/7/2022 11:41 AM CST -----  Contact: patient  Pt would like to speak to nurse in regards to glucose monitor that was supposed to be ordered     Call back @[phone]

## 2022-01-11 ENCOUNTER — CLINICAL SUPPORT (OUTPATIENT)
Dept: ENDOCRINOLOGY | Facility: CLINIC | Age: 82
End: 2022-01-11
Payer: MEDICARE

## 2022-01-11 DIAGNOSIS — E16.2 HYPOGLYCEMIA: ICD-10-CM

## 2022-01-11 NOTE — PROGRESS NOTES
"DIABETES EDUCATOR NOTE   PLACEMENT OF DEXCOM G6 PRO SENSOR  CONTINOUS GLUCOSE MONITORING SYSTEM (CGMS)     Patient is here in clinic today for placement of continuous glucose monitoring sensor.                Each patient verified that they were here for CGMS procedure ordered by their provider and that they have a working glucose meter and supplies at home.   Patient will be provided with a Dexcom G6 Pro sensor, transmitter, and a copy of the Continuous Glucose Monitoring Patient Log to fill out during the study.              A detailed  explanation of Continuous Glucose Monitoring was  provided. Patient informed that this is a blind procedure and that they will not actually see the blood sugar tracing in real time.    Instructed patient to check blood sugar using home glucometer and to record the following on provided patient log sheets:Blood sugar taken at home, Meals and snacks, Activity, and Diabetes medications taken and dosage               Patient was brought to a private location.  Site selected and prepared and allowed to dry. Glucose Transmitter Serial Number [id number] was inserted to patient's abdomen.               The following forms  were given and reviewed in detail with patient and all questions answered.   · Continuous Glucose Monitoring Patient Log   · Dexcom G6 PRO Patient Handout "Blinded CGM Patient Handout"                 Instructions: Time: 15 min   Insertion of sensor:  Time: 5 minutes     "

## 2022-01-18 ENCOUNTER — CLINICAL SUPPORT (OUTPATIENT)
Dept: ENDOCRINOLOGY | Facility: CLINIC | Age: 82
End: 2022-01-18
Payer: MEDICARE

## 2022-01-18 DIAGNOSIS — E16.2 HYPOGLYCEMIA: ICD-10-CM

## 2022-01-18 PROCEDURE — 95250 PR GLUCOSE MONITORING,72 HRS,SUB-Q SENSOR: ICD-10-PCS | Mod: S$GLB,,, | Performed by: NURSE PRACTITIONER

## 2022-01-18 PROCEDURE — 95251 PR GLUCOSE MONITOR, 72 HOUR, PHYS INTERP: ICD-10-PCS | Mod: S$GLB,,, | Performed by: NURSE PRACTITIONER

## 2022-01-18 PROCEDURE — 95251 CONT GLUC MNTR ANALYSIS I&R: CPT | Mod: S$GLB,,, | Performed by: NURSE PRACTITIONER

## 2022-01-18 PROCEDURE — 95250 CONT GLUC MNTR PHYS/QHP EQP: CPT | Mod: S$GLB,,, | Performed by: NURSE PRACTITIONER

## 2022-01-18 NOTE — PROGRESS NOTES
Return of the Dexcom G6 Pro Sensor and Patient Log.     Patient returned to clinic today to return Glucose Sensor and signed patient log form used in CGMS procedure.     The CGMS Sensor will be scanned and downloaded. All reports will be imported into the patient's electronic medical record.     Endocrine Provider will complete data interpretation and make recommendations; will forward recommendations to the ordering provider for follow up with patient.

## 2022-01-25 ENCOUNTER — PATIENT MESSAGE (OUTPATIENT)
Dept: ENDOCRINOLOGY | Facility: CLINIC | Age: 82
End: 2022-01-25
Payer: MEDICARE

## 2022-01-25 ENCOUNTER — TELEPHONE (OUTPATIENT)
Dept: ENDOCRINOLOGY | Facility: CLINIC | Age: 82
End: 2022-01-25
Payer: MEDICARE

## 2022-01-25 NOTE — TELEPHONE ENCOUNTER
----- Message from Jennifer Pisano sent at 1/25/2022  1:53 PM CST -----  Regarding: RESULTS  Contact: Self  Pt ask for a call w/ results, Pt stated she have check her [facility] however nothing was posted Pt stated she brought the device back on last Tuesday - 01/18/2022 after wearing the device for a week.      Contact info  [phone] cell

## 2022-02-22 ENCOUNTER — PATIENT MESSAGE (OUTPATIENT)
Dept: HEMATOLOGY/ONCOLOGY | Facility: CLINIC | Age: 82
End: 2022-02-22
Payer: MEDICARE

## 2022-04-22 ENCOUNTER — HOSPITAL ENCOUNTER (OUTPATIENT)
Dept: RADIOLOGY | Facility: HOSPITAL | Age: 82
Discharge: HOME | End: 2022-04-22
Attending: INTERNAL MEDICINE
Payer: MEDICARE

## 2022-04-22 DIAGNOSIS — Z85.09 HISTORY OF GASTROINTESTINAL STROMAL TUMOR (GIST): ICD-10-CM

## 2022-04-22 PROCEDURE — 71260 CT THORAX DX C+: CPT | Mod: 26,,, | Performed by: RADIOLOGY

## 2022-04-22 PROCEDURE — 74177 CT CHEST ABDOMEN PELVIS WITH CONTRAST (XPD): ICD-10-PCS | Mod: 26,,, | Performed by: RADIOLOGY

## 2022-04-22 PROCEDURE — 71260 CT CHEST ABDOMEN PELVIS WITH CONTRAST (XPD): ICD-10-PCS | Mod: 26,,, | Performed by: RADIOLOGY

## 2022-04-22 PROCEDURE — 25500020 PHARM REV CODE 255: Performed by: INTERNAL MEDICINE

## 2022-04-22 PROCEDURE — 74177 CT ABD & PELVIS W/CONTRAST: CPT | Mod: 26,,, | Performed by: RADIOLOGY

## 2022-04-22 PROCEDURE — 71260 CT THORAX DX C+: CPT | Mod: TC

## 2022-04-22 PROCEDURE — 74177 CT ABD & PELVIS W/CONTRAST: CPT | Mod: TC

## 2022-04-22 RX ADMIN — IOHEXOL 75 ML: 350 INJECTION, SOLUTION INTRAVENOUS at 08:04

## 2022-04-22 RX ADMIN — IOHEXOL 15 ML: 300 INJECTION, SOLUTION INTRAVENOUS at 07:04

## 2022-04-24 ENCOUNTER — PATIENT MESSAGE (OUTPATIENT)
Dept: HEMATOLOGY/ONCOLOGY | Facility: CLINIC | Age: 82
End: 2022-04-24
Payer: MEDICARE

## 2022-04-27 ENCOUNTER — OFFICE VISIT (OUTPATIENT)
Dept: HEMATOLOGY/ONCOLOGY | Facility: CLINIC | Age: 82
End: 2022-04-27
Payer: MEDICARE

## 2022-04-27 VITALS
SYSTOLIC BLOOD PRESSURE: 138 MMHG | BODY MASS INDEX: 28.56 KG/M2 | HEIGHT: 63 IN | TEMPERATURE: 99 F | HEART RATE: 84 BPM | RESPIRATION RATE: 18 BRPM | DIASTOLIC BLOOD PRESSURE: 95 MMHG | WEIGHT: 161.19 LBS | OXYGEN SATURATION: 96 %

## 2022-04-27 DIAGNOSIS — Z85.09 HISTORY OF GASTROINTESTINAL STROMAL TUMOR (GIST): Primary | ICD-10-CM

## 2022-04-27 DIAGNOSIS — E03.9 HYPOTHYROIDISM, UNSPECIFIED TYPE: ICD-10-CM

## 2022-04-27 PROCEDURE — 3288F PR FALLS RISK ASSESSMENT DOCUMENTED: ICD-10-PCS | Mod: CPTII,S$GLB,, | Performed by: INTERNAL MEDICINE

## 2022-04-27 PROCEDURE — 3075F SYST BP GE 130 - 139MM HG: CPT | Mod: CPTII,S$GLB,, | Performed by: INTERNAL MEDICINE

## 2022-04-27 PROCEDURE — 99999 PR PBB SHADOW E&M-EST. PATIENT-LVL III: ICD-10-PCS | Mod: PBBFAC,,, | Performed by: INTERNAL MEDICINE

## 2022-04-27 PROCEDURE — 99214 PR OFFICE/OUTPT VISIT, EST, LEVL IV, 30-39 MIN: ICD-10-PCS | Mod: S$GLB,,, | Performed by: INTERNAL MEDICINE

## 2022-04-27 PROCEDURE — 3075F PR MOST RECENT SYSTOLIC BLOOD PRESS GE 130-139MM HG: ICD-10-PCS | Mod: CPTII,S$GLB,, | Performed by: INTERNAL MEDICINE

## 2022-04-27 PROCEDURE — 99999 PR PBB SHADOW E&M-EST. PATIENT-LVL III: CPT | Mod: PBBFAC,,, | Performed by: INTERNAL MEDICINE

## 2022-04-27 PROCEDURE — 1126F AMNT PAIN NOTED NONE PRSNT: CPT | Mod: CPTII,S$GLB,, | Performed by: INTERNAL MEDICINE

## 2022-04-27 PROCEDURE — 3288F FALL RISK ASSESSMENT DOCD: CPT | Mod: CPTII,S$GLB,, | Performed by: INTERNAL MEDICINE

## 2022-04-27 PROCEDURE — 99214 OFFICE O/P EST MOD 30 MIN: CPT | Mod: S$GLB,,, | Performed by: INTERNAL MEDICINE

## 2022-04-27 PROCEDURE — 1126F PR PAIN SEVERITY QUANTIFIED, NO PAIN PRESENT: ICD-10-PCS | Mod: CPTII,S$GLB,, | Performed by: INTERNAL MEDICINE

## 2022-04-27 PROCEDURE — 3080F DIAST BP >= 90 MM HG: CPT | Mod: CPTII,S$GLB,, | Performed by: INTERNAL MEDICINE

## 2022-04-27 PROCEDURE — 1101F PR PT FALLS ASSESS DOC 0-1 FALLS W/OUT INJ PAST YR: ICD-10-PCS | Mod: CPTII,S$GLB,, | Performed by: INTERNAL MEDICINE

## 2022-04-27 PROCEDURE — 3080F PR MOST RECENT DIASTOLIC BLOOD PRESSURE >= 90 MM HG: ICD-10-PCS | Mod: CPTII,S$GLB,, | Performed by: INTERNAL MEDICINE

## 2022-04-27 PROCEDURE — 1101F PT FALLS ASSESS-DOCD LE1/YR: CPT | Mod: CPTII,S$GLB,, | Performed by: INTERNAL MEDICINE

## 2022-04-27 NOTE — PROGRESS NOTES
"Subjective:       Patient ID: Simi Encinas is a 81 y.o. female.    Chief Complaint: History of gastrointestinal stromal tumor (GIST)  Patient is a 81 y.o. female presents with a new diagnosis of GIST. She was initially evaluated in the ED at [place] in July 2020 for abdominal pain and nausea and underwent a right upper quadrant ultrasound which demonstrated gallstones along with a mass   CT scan on August 2020 revealed a mass in the left upper quadrant measuring approximately 6.5 cm which appears to be arising from the lesser curvature of the gastric body and extending exophytically as detailed above suggestive of a gastrointestinal stromal tumor along with slightly prominent lymph nodes in the gastrohepatic ligament. The pancreas is atrophic but otherwise normal in appearance.  She underwent partial gastrectomy on 9/1/2020 which reveals 5.7 cm unifocal mass along lesser curvature, GIST 2 mitosis/hpf, negative margins and negative nodes           HPIShe comes in to review CT scans which reveal "Status post left breast surgery, hysterectomy, cholecystectomy and partial gastrectomy, no recurrent or metastatic disease in reference to GIST. Dilation of biliary tree apparently postop cholecystectomy stable. Multinodular goiter, consideration for progress diagnostic ultrasound suggested"  She feels well and denies any new issues    Review of Systems   Constitutional: Negative for appetite change, fatigue and unexpected weight change.   HENT: Negative for mouth sores.    Eyes: Negative for visual disturbance.   Respiratory: Negative for cough and shortness of breath.    Cardiovascular: Negative for chest pain.   Gastrointestinal: Negative for abdominal pain and diarrhea.   Genitourinary: Negative for frequency.   Musculoskeletal: Negative for back pain.   Integumentary:  Negative for rash.   Neurological: Negative for headaches.   Hematological: Negative for adenopathy.   Psychiatric/Behavioral: The patient is not " nervous/anxious.    All other systems reviewed and are negative.        Objective:      Physical Exam  Vitals reviewed.   Constitutional:       Appearance: She is well-developed.   HENT:      Mouth/Throat:      Pharynx: No oropharyngeal exudate.   Cardiovascular:      Rate and Rhythm: Normal rate.      Heart sounds: Normal heart sounds.   Pulmonary:      Effort: Pulmonary effort is normal.      Breath sounds: Normal breath sounds. No wheezing.   Abdominal:      General: Bowel sounds are normal.      Palpations: Abdomen is soft.      Tenderness: There is no abdominal tenderness.   Musculoskeletal:         General: No tenderness.   Lymphadenopathy:      Cervical: No cervical adenopathy.   Skin:     General: Skin is warm and dry.      Findings: No rash.   Neurological:      Mental Status: She is alert and oriented to person, place, and time.      Coordination: Coordination normal.   Psychiatric:         Thought Content: Thought content normal.         Judgment: Judgment normal.           LABS:  WBC   Date Value Ref Range Status   04/22/2022 4.62 3.90 - 12.70 K/uL Final   04/22/2022 4.62 3.90 - 12.70 K/uL Final     Hemoglobin   Date Value Ref Range Status   04/22/2022 15.5 12.0 - 16.0 g/dL Final   04/22/2022 15.5 12.0 - 16.0 g/dL Final     Hematocrit   Date Value Ref Range Status   04/22/2022 47.6 37.0 - 48.5 % Final   04/22/2022 47.6 37.0 - 48.5 % Final     Platelets   Date Value Ref Range Status   04/22/2022 284 150 - 450 K/uL Final   04/22/2022 284 150 - 450 K/uL Final     Gran # (ANC)   Date Value Ref Range Status   04/22/2022 1.7 (L) 1.8 - 7.7 K/uL Final     Comment:     The ANC is based on a white cell differential from an   automated cell counter. It has not been microscopically   reviewed for the presence of abnormal cells. Clinical   correlation is required.     04/22/2022 1.7 (L) 1.8 - 7.7 K/uL Final     Comment:     The ANC is based on a white cell differential from an   automated cell counter. It has not  been microscopically   reviewed for the presence of abnormal cells. Clinical   correlation is required.         Chemistry        Component Value Date/Time     04/22/2022 0758     04/22/2022 0758    K 4.4 04/22/2022 0758    K 4.4 04/22/2022 0758     04/22/2022 0758     04/22/2022 0758    CO2 26 04/22/2022 0758    CO2 26 04/22/2022 0758    BUN 19 04/22/2022 0758    BUN 19 04/22/2022 0758    CREATININE 0.8 04/22/2022 0758    CREATININE 0.8 04/22/2022 0758    GLU 97 04/22/2022 0758    GLU 97 04/22/2022 0758        Component Value Date/Time    CALCIUM 10.1 04/22/2022 0758    CALCIUM 10.1 04/22/2022 0758    ALKPHOS 51 (L) 04/22/2022 0758    ALKPHOS 51 (L) 04/22/2022 0758    AST 19 04/22/2022 0758    AST 19 04/22/2022 0758    ALT 12 04/22/2022 0758    ALT 12 04/22/2022 0758    BILITOT 0.5 04/22/2022 0758    BILITOT 0.5 04/22/2022 0758    ESTGFRAFRICA >60 04/22/2022 0758    ESTGFRAFRICA >60 04/22/2022 0758    EGFRNONAA >60 04/22/2022 0758    EGFRNONAA >60 04/22/2022 0758          Colonoscopy on 4/16/2022 at [facility]: end to end anastomosis in proximal rectum is normal, diverticulosis of whole colon.      Assessment:       Problem List Items Addressed This Visit     History of gastrointestinal stromal tumor (GIST) - Primary    Relevant Orders    CBC W/ AUTO DIFFERENTIAL    Comprehensive Metabolic Panel    CT Chest Abdomen Pelvis With Contrast (xpd)      Other Visit Diagnoses     Hypothyroidism, unspecified type        Relevant Orders    TSH    T4, Free          Plan:         Route Chart for Scheduling    Med Onc Chart Routing      Follow up with physician 6 months. Labs tomorrow on [facility] for TSH and free T4. schedule CBC,CMp, CT chest, abdomen/pelvis and see me in 6 months    Follow up with [facility]    Labs    Imaging    Pharmacy appointment    Other referrals           Will check TSH and free T4 tomororw and forward to her Endocrinologist.  She will return in 6 months with labs and CT scans and if no  evidence of recurrence at that time will start yearly surveillance    Above care plan was discussed with patient and all questions were addressed to her satisfaction

## 2022-04-28 ENCOUNTER — PATIENT MESSAGE (OUTPATIENT)
Dept: HEMATOLOGY/ONCOLOGY | Facility: CLINIC | Age: 82
End: 2022-04-28
Payer: MEDICARE

## 2022-04-28 ENCOUNTER — LAB VISIT (OUTPATIENT)
Dept: LAB | Facility: HOSPITAL | Age: 82
End: 2022-04-28
Attending: INTERNAL MEDICINE
Payer: MEDICARE

## 2022-04-28 DIAGNOSIS — Z85.09 HISTORY OF GASTROINTESTINAL STROMAL TUMOR (GIST): ICD-10-CM

## 2022-04-28 DIAGNOSIS — E03.9 HYPOTHYROIDISM, UNSPECIFIED TYPE: ICD-10-CM

## 2022-04-28 LAB
ALBUMIN SERPL BCP-MCNC: 4.1 G/DL (ref 3.5–5.2)
ALP SERPL-CCNC: 58 U/L (ref 55–135)
ALT SERPL W/O P-5'-P-CCNC: 20 U/L (ref 10–44)
ANION GAP SERPL CALC-SCNC: 10 MMOL/L (ref 8–16)
AST SERPL-CCNC: 22 U/L (ref 10–40)
BASOPHILS # BLD AUTO: 0.07 K/UL (ref 0–0.2)
BASOPHILS NFR BLD: 0.9 % (ref 0–1.9)
BILIRUB SERPL-MCNC: 0.5 MG/DL (ref 0.1–1)
BUN SERPL-MCNC: 17 MG/DL (ref 8–23)
CALCIUM SERPL-MCNC: 10.2 MG/DL (ref 8.7–10.5)
CHLORIDE SERPL-SCNC: 105 MMOL/L (ref 95–110)
CO2 SERPL-SCNC: 24 MMOL/L (ref 23–29)
CREAT SERPL-MCNC: 0.9 MG/DL (ref 0.5–1.4)
DIFFERENTIAL METHOD: NORMAL
EOSINOPHIL # BLD AUTO: 0.2 K/UL (ref 0–0.5)
EOSINOPHIL NFR BLD: 2.3 % (ref 0–8)
ERYTHROCYTE [DISTWIDTH] IN BLOOD BY AUTOMATED COUNT: 13.8 % (ref 11.5–14.5)
EST. GFR  (AFRICAN AMERICAN): >60 ML/MIN/1.73 M^2
EST. GFR  (NON AFRICAN AMERICAN): >60 ML/MIN/1.73 M^2
GLUCOSE SERPL-MCNC: 101 MG/DL (ref 70–110)
HCT VFR BLD AUTO: 45 % (ref 37–48.5)
HGB BLD-MCNC: 14.8 G/DL (ref 12–16)
IMM GRANULOCYTES # BLD AUTO: 0.03 K/UL (ref 0–0.04)
IMM GRANULOCYTES NFR BLD AUTO: 0.4 % (ref 0–0.5)
LYMPHOCYTES # BLD AUTO: 2.6 K/UL (ref 1–4.8)
LYMPHOCYTES NFR BLD: 34.6 % (ref 18–48)
MCH RBC QN AUTO: 29.7 PG (ref 27–31)
MCHC RBC AUTO-ENTMCNC: 32.9 G/DL (ref 32–36)
MCV RBC AUTO: 90 FL (ref 82–98)
MONOCYTES # BLD AUTO: 0.6 K/UL (ref 0.3–1)
MONOCYTES NFR BLD: 7.6 % (ref 4–15)
NEUTROPHILS # BLD AUTO: 4.1 K/UL (ref 1.8–7.7)
NEUTROPHILS NFR BLD: 54.2 % (ref 38–73)
NRBC BLD-RTO: 0 /100 WBC
PLATELET # BLD AUTO: 289 K/UL (ref 150–450)
PMV BLD AUTO: 10.7 FL (ref 9.2–12.9)
POTASSIUM SERPL-SCNC: 4.6 MMOL/L (ref 3.5–5.1)
PROT SERPL-MCNC: 7.3 G/DL (ref 6–8.4)
RBC # BLD AUTO: 4.98 M/UL (ref 4–5.4)
SODIUM SERPL-SCNC: 139 MMOL/L (ref 136–145)
T4 FREE SERPL-MCNC: 0.98 NG/DL (ref 0.71–1.51)
TSH SERPL DL<=0.005 MIU/L-ACNC: 1.19 UIU/ML (ref 0.4–4)
WBC # BLD AUTO: 7.51 K/UL (ref 3.9–12.7)

## 2022-04-28 PROCEDURE — 84439 ASSAY OF FREE THYROXINE: CPT | Performed by: INTERNAL MEDICINE

## 2022-04-28 PROCEDURE — 85025 COMPLETE CBC W/AUTO DIFF WBC: CPT | Performed by: INTERNAL MEDICINE

## 2022-04-28 PROCEDURE — 80053 COMPREHEN METABOLIC PANEL: CPT | Performed by: INTERNAL MEDICINE

## 2022-04-28 PROCEDURE — 84443 ASSAY THYROID STIM HORMONE: CPT | Performed by: INTERNAL MEDICINE

## 2022-05-17 ENCOUNTER — IMMUNIZATION (OUTPATIENT)
Dept: OBSTETRICS AND GYNECOLOGY | Facility: CLINIC | Age: 82
End: 2022-05-17
Payer: MEDICARE

## 2022-05-17 DIAGNOSIS — Z23 NEED FOR VACCINATION: Primary | ICD-10-CM

## 2022-05-17 PROCEDURE — 91305 COVID-19, MRNA, LNP-S, PF, 30 MCG/0.3 ML DOSE VACCINE (PFIZER): CPT | Mod: PBBFAC | Performed by: FAMILY MEDICINE

## 2022-06-01 ENCOUNTER — TELEPHONE (OUTPATIENT)
Dept: PRIMARY CARE CLINIC | Facility: CLINIC | Age: 82
End: 2022-06-01
Payer: MEDICARE

## 2022-06-01 NOTE — TELEPHONE ENCOUNTER
----- Message from Kennedi Marques sent at 6/1/2022  4:32 PM CDT -----  Contact: MATEO MATTHEWS [[id number]] [phone]  Type:  RX Refill Request    Who Called: MATEO MATTHEWS [[id number]]  Refill or New Rx: Refill  RX Name and Strength: ezetimibe (ZETIA) 10 mg tablet  How is the patient currently taking it? (ex. 1XDay): Take 10 mg by mouth once daily. allergic to statins - Oral  Is this a 30 day or 90 day RX: 90  Preferred Pharmacy with phone number: [facility], [phone]  Local or Mail Order: Mail  Ordering Provider: Chris Maldonado MD  Would the patient rather a call back or a response via [facility]? Phone call  Best Call Back Number: [phone]  Additional Information:      PRIOR AUTHORIZATION REQUEST    Patient indicates she needs Dr. Dougherty to send prior authorization to [facility] for patient's Prolia shot on 6/10/22 at [facility]

## 2022-09-22 ENCOUNTER — IMMUNIZATION (OUTPATIENT)
Dept: OBSTETRICS AND GYNECOLOGY | Facility: CLINIC | Age: 82
End: 2022-09-22
Payer: MEDICARE

## 2022-09-22 DIAGNOSIS — Z23 NEED FOR VACCINATION: Primary | ICD-10-CM

## 2022-09-22 PROCEDURE — 91312 COVID-19, MRNA, LNP-S, BIVALENT BOOSTER, PF, 30 MCG/0.3 ML DOSE: CPT | Mod: S$GLB,,, | Performed by: FAMILY MEDICINE

## 2022-09-22 PROCEDURE — 91312 COVID-19, MRNA, LNP-S, BIVALENT BOOSTER, PF, 30 MCG/0.3 ML DOSE: ICD-10-PCS | Mod: S$GLB,,, | Performed by: FAMILY MEDICINE

## 2022-09-22 PROCEDURE — 0124A COVID-19, MRNA, LNP-S, BIVALENT BOOSTER, PF, 30 MCG/0.3 ML DOSE: CPT | Mod: PBBFAC | Performed by: FAMILY MEDICINE

## 2022-12-21 ENCOUNTER — HOSPITAL ENCOUNTER (OUTPATIENT)
Dept: RADIOLOGY | Facility: HOSPITAL | Age: 82
Discharge: HOME | End: 2022-12-21
Attending: INTERNAL MEDICINE
Payer: MEDICARE

## 2022-12-21 DIAGNOSIS — Z85.09 HISTORY OF GASTROINTESTINAL STROMAL TUMOR (GIST): ICD-10-CM

## 2022-12-21 PROCEDURE — 25500020 PHARM REV CODE 255: Performed by: INTERNAL MEDICINE

## 2022-12-21 PROCEDURE — 74177 CT ABD & PELVIS W/CONTRAST: CPT | Mod: 26,,, | Performed by: RADIOLOGY

## 2022-12-21 PROCEDURE — 74177 CT ABD & PELVIS W/CONTRAST: CPT | Mod: TC

## 2022-12-21 PROCEDURE — 74177 CT CHEST ABDOMEN PELVIS WITH CONTRAST (XPD): ICD-10-PCS | Mod: 26,,, | Performed by: RADIOLOGY

## 2022-12-21 PROCEDURE — 71260 CT THORAX DX C+: CPT | Mod: TC

## 2022-12-21 PROCEDURE — 71260 CT CHEST ABDOMEN PELVIS WITH CONTRAST (XPD): ICD-10-PCS | Mod: 26,,, | Performed by: RADIOLOGY

## 2022-12-21 PROCEDURE — 71260 CT THORAX DX C+: CPT | Mod: 26,,, | Performed by: RADIOLOGY

## 2022-12-21 PROCEDURE — A9698 NON-RAD CONTRAST MATERIALNOC: HCPCS | Performed by: INTERNAL MEDICINE

## 2022-12-21 RX ADMIN — BARIUM SULFATE 450 ML: 20 SUSPENSION ORAL at 09:12

## 2022-12-21 RX ADMIN — IOHEXOL 85 ML: 350 INJECTION, SOLUTION INTRAVENOUS at 09:12

## 2022-12-28 ENCOUNTER — OFFICE VISIT (OUTPATIENT)
Dept: HEMATOLOGY/ONCOLOGY | Facility: CLINIC | Age: 82
End: 2022-12-28
Payer: MEDICARE

## 2022-12-28 VITALS
WEIGHT: 168.88 LBS | RESPIRATION RATE: 18 BRPM | OXYGEN SATURATION: 96 % | HEIGHT: 63 IN | DIASTOLIC BLOOD PRESSURE: 79 MMHG | TEMPERATURE: 98 F | HEART RATE: 88 BPM | BODY MASS INDEX: 29.92 KG/M2 | SYSTOLIC BLOOD PRESSURE: 144 MMHG

## 2022-12-28 DIAGNOSIS — Z85.09 HISTORY OF GASTROINTESTINAL STROMAL TUMOR (GIST): Primary | ICD-10-CM

## 2022-12-28 PROCEDURE — 99213 OFFICE O/P EST LOW 20 MIN: CPT | Mod: S$GLB,,, | Performed by: INTERNAL MEDICINE

## 2022-12-28 PROCEDURE — 3288F FALL RISK ASSESSMENT DOCD: CPT | Mod: CPTII,S$GLB,, | Performed by: INTERNAL MEDICINE

## 2022-12-28 PROCEDURE — 3078F PR MOST RECENT DIASTOLIC BLOOD PRESSURE < 80 MM HG: ICD-10-PCS | Mod: CPTII,S$GLB,, | Performed by: INTERNAL MEDICINE

## 2022-12-28 PROCEDURE — 99999 PR PBB SHADOW E&M-EST. PATIENT-LVL IV: ICD-10-PCS | Mod: PBBFAC,,, | Performed by: INTERNAL MEDICINE

## 2022-12-28 PROCEDURE — 1101F PT FALLS ASSESS-DOCD LE1/YR: CPT | Mod: CPTII,S$GLB,, | Performed by: INTERNAL MEDICINE

## 2022-12-28 PROCEDURE — 1125F AMNT PAIN NOTED PAIN PRSNT: CPT | Mod: CPTII,S$GLB,, | Performed by: INTERNAL MEDICINE

## 2022-12-28 PROCEDURE — 3288F PR FALLS RISK ASSESSMENT DOCUMENTED: ICD-10-PCS | Mod: CPTII,S$GLB,, | Performed by: INTERNAL MEDICINE

## 2022-12-28 PROCEDURE — 1159F MED LIST DOCD IN RCRD: CPT | Mod: CPTII,S$GLB,, | Performed by: INTERNAL MEDICINE

## 2022-12-28 PROCEDURE — 1125F PR PAIN SEVERITY QUANTIFIED, PAIN PRESENT: ICD-10-PCS | Mod: CPTII,S$GLB,, | Performed by: INTERNAL MEDICINE

## 2022-12-28 PROCEDURE — 3077F SYST BP >= 140 MM HG: CPT | Mod: CPTII,S$GLB,, | Performed by: INTERNAL MEDICINE

## 2022-12-28 PROCEDURE — 99213 PR OFFICE/OUTPT VISIT, EST, LEVL III, 20-29 MIN: ICD-10-PCS | Mod: S$GLB,,, | Performed by: INTERNAL MEDICINE

## 2022-12-28 PROCEDURE — 1159F PR MEDICATION LIST DOCUMENTED IN MEDICAL RECORD: ICD-10-PCS | Mod: CPTII,S$GLB,, | Performed by: INTERNAL MEDICINE

## 2022-12-28 PROCEDURE — 1101F PR PT FALLS ASSESS DOC 0-1 FALLS W/OUT INJ PAST YR: ICD-10-PCS | Mod: CPTII,S$GLB,, | Performed by: INTERNAL MEDICINE

## 2022-12-28 PROCEDURE — 3077F PR MOST RECENT SYSTOLIC BLOOD PRESSURE >= 140 MM HG: ICD-10-PCS | Mod: CPTII,S$GLB,, | Performed by: INTERNAL MEDICINE

## 2022-12-28 PROCEDURE — 99999 PR PBB SHADOW E&M-EST. PATIENT-LVL IV: CPT | Mod: PBBFAC,,, | Performed by: INTERNAL MEDICINE

## 2022-12-28 PROCEDURE — 3078F DIAST BP <80 MM HG: CPT | Mod: CPTII,S$GLB,, | Performed by: INTERNAL MEDICINE

## 2022-12-28 NOTE — PROGRESS NOTES
"Subjective:       Patient ID: Simi Encinas is a 82 y.o. female.    Chief Complaint: History of gastrointestinal stromal tumor (GIST)  ONCOLOGIC HISTORY: Patient is a 82 y.o. female presents with a diagnosis of GIST. She was initially evaluated in the ED at [place] in July 2020 for abdominal pain and nausea and underwent a right upper quadrant ultrasound which demonstrated gallstones along with a mass   CT scan on August 2020 revealed a mass in the left upper quadrant measuring approximately 6.5 cm which appears to be arising from the lesser curvature of the gastric body and extending exophytically as detailed above suggestive of a gastrointestinal stromal tumor along with slightly prominent lymph nodes in the gastrohepatic ligament. The pancreas is atrophic but otherwise normal in appearance.  She underwent partial gastrectomy on 9/1/2020 which reveals 5.7 cm unifocal mass along lesser curvature, GIST 2 mitosis/hpf, negative margins and negative     HPICT scans reveals "No evidence of recurrent or residual neoplasm. Stable bile duct dilatation. Postoperative change of a partial gastrectomy, cholecystectomy, and hysterectomy. Coronary artery calcifications.Left lower pole renal cyst"  She feels well and denies any new issues    Review of Systems   Constitutional:  Negative for appetite change, fatigue and unexpected weight change.   HENT:  Negative for mouth sores.    Eyes:  Negative for visual disturbance.   Respiratory:  Negative for cough and shortness of breath.    Cardiovascular:  Negative for chest pain.   Gastrointestinal:  Negative for abdominal pain and diarrhea.   Genitourinary:  Negative for frequency.   Musculoskeletal:  Negative for back pain.   Integumentary:  Negative for rash.   Neurological:  Negative for headaches.   Hematological:  Negative for adenopathy.   Psychiatric/Behavioral:  The patient is not nervous/anxious.    All other systems reviewed and are negative.      Objective:    "   Physical Exam  Vitals reviewed.   Constitutional:       Appearance: She is well-developed.   HENT:      Mouth/Throat:      Pharynx: No oropharyngeal exudate.   Cardiovascular:      Rate and Rhythm: Normal rate.      Heart sounds: Normal heart sounds.   Pulmonary:      Effort: Pulmonary effort is normal.      Breath sounds: Normal breath sounds. No wheezing.   Abdominal:      General: Bowel sounds are normal.      Palpations: Abdomen is soft.      Tenderness: There is no abdominal tenderness.   Musculoskeletal:         General: No tenderness.   Lymphadenopathy:      Cervical: No cervical adenopathy.   Skin:     General: Skin is warm and dry.      Findings: No rash.   Neurological:      Mental Status: She is alert and oriented to person, place, and time.      Coordination: Coordination normal.   Psychiatric:         Thought Content: Thought content normal.         Judgment: Judgment normal.         LABS:  WBC   Date Value Ref Range Status   04/28/2022 7.51 3.90 - 12.70 K/uL Final     Hemoglobin   Date Value Ref Range Status   04/28/2022 14.8 12.0 - 16.0 g/dL Final     Hematocrit   Date Value Ref Range Status   04/28/2022 45.0 37.0 - 48.5 % Final     Platelets   Date Value Ref Range Status   04/28/2022 289 150 - 450 K/uL Final     Gran # (ANC)   Date Value Ref Range Status   04/28/2022 4.1 1.8 - 7.7 K/uL Final     Gran %   Date Value Ref Range Status   04/28/2022 54.2 38.0 - 73.0 % Final       Chemistry        Component Value Date/Time     12/21/2022 0814    K 4.0 12/21/2022 0814     12/21/2022 0814    CO2 29 12/21/2022 0814    BUN 20 12/21/2022 0814    CREATININE 1.0 12/21/2022 0814    GLU 98 12/21/2022 0814        Component Value Date/Time    CALCIUM 10.4 12/21/2022 0814    ALKPHOS 86 12/21/2022 0814    AST 20 12/21/2022 0814    ALT 18 12/21/2022 0814    BILITOT 0.5 12/21/2022 0814    ESTGFRAFRICA >60 04/28/2022 1133    EGFRNONAA >60 04/28/2022 1133          Assessment:       Problem List Items  Addressed This Visit       History of gastrointestinal stromal tumor (GIST) - Primary    Relevant Orders    CBC w/ DIFF    CMP    CT Chest Abdomen Pelvis With Contrast (xpd)       Plan:         Route Chart for Scheduling    Med Onc Chart Routing      Follow up with physician 1 year. Schedule CBC, CMP, CT chest, abdomen/pelvis and see me   Follow up with [facility]    Infusion scheduling note    Injection scheduling note    Labs    Imaging    Pharmacy appointment    Other referrals            She is doing well clinically with no evidence of recurrence on her CT scans, Will return in 1 year with labs and CT scans    Above care plan was discussed with patient and all questions were addressed to her satisfaction

## 2023-12-14 ENCOUNTER — TELEPHONE (OUTPATIENT)
Dept: HEMATOLOGY/ONCOLOGY | Facility: CLINIC | Age: 83
End: 2023-12-14
Payer: MEDICARE

## 2023-12-14 NOTE — TELEPHONE ENCOUNTER
"----- Message from Darlyn Garcia sent at 12/14/2023 12:38 PM CST -----  Regarding: Pt advice  Contact: Pt    Pt requesting call back in regards to CT that she doesn't believe she can have.   Please call and adv @          Confirmed contact below:   Contact Name: Simi Encinas  Phone Number: [phone]               Additional Notes:  "Thank you for all that you do for our patients"                                           "

## 2023-12-14 NOTE — TELEPHONE ENCOUNTER
"Spoke with patient.  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    She will not be able to follow dr burden come the new year, as her insurance is changing.  She states she will just read the results on her portal.        Message routed to dr burden (for Ct, GFR must be greater than 30)  "

## 2023-12-15 NOTE — TELEPHONE ENCOUNTER
Her crcl is 51ml/min by Cockcroft gault calculation based on labs from 12/21/2022 which would be adequate to get contrast, But Radiology will do a creatinine on her prior to her Ct scans right since the lab is old?

## 2023-12-15 NOTE — TELEPHONE ENCOUNTER
Spoke with patient.  Explained if her labs are OK the day of the scan---she can proceed with contrast---  If not--the department would not do the scan.  She prefers to cancel everything at this time---  And she will establish care with a new provider covered under her new insurance policy in the new year.

## (undated) DEVICE — DRESSING TRANS 4X4 TEGADERM

## (undated) DEVICE — SOL ELECTROLUBE ANTI-STIC

## (undated) DEVICE — KIT PROCEDURE STER INLET CLOSU

## (undated) DEVICE — COVER LIGHT HANDLE

## (undated) DEVICE — DRAIN CHANNEL ROUND 10FR

## (undated) DEVICE — SEE MEDLINE ITEM 152622

## (undated) DEVICE — DRAPE ARM DAVINCI XI

## (undated) DEVICE — FILTER STRAW

## (undated) DEVICE — SUT MCRYL PLUS 4-0 PS2 27IN

## (undated) DEVICE — GAUZE SPONGE 4X4 12PLY

## (undated) DEVICE — SUT 3/0 30IN ETHILON BLK M

## (undated) DEVICE — BAG TISSUE RETRIEVAL 5MM

## (undated) DEVICE — SOL NS 1000CC

## (undated) DEVICE — SEE MEDLINE ITEM 156902

## (undated) DEVICE — OBTURATOR BLADELESS 8MM XI CLR

## (undated) DEVICE — SEAL UNIVERSAL 5MM-8MM XI

## (undated) DEVICE — SEE MEDLINE ITEM 146313

## (undated) DEVICE — SET DECANTER MEDICHOICE

## (undated) DEVICE — SYR SLIP TIP 20CC

## (undated) DEVICE — SYR SLIP TIP 1CC

## (undated) DEVICE — KIT ANTIFOG

## (undated) DEVICE — SOL 9P NACL IRR PIC IL

## (undated) DEVICE — SEE MEDLINE ITEM 157131

## (undated) DEVICE — SUT 0 VICRYL / UR6 (J603)

## (undated) DEVICE — SEALER VESSEL EXTEND

## (undated) DEVICE — Device

## (undated) DEVICE — BLADE SURG #15 CARBON STEEL

## (undated) DEVICE — CANNULA REDUCER 12-8MM

## (undated) DEVICE — SUT 1 48IN PDS II VIO MONO

## (undated) DEVICE — LUBRICANT SURGILUBE 2 OZ

## (undated) DEVICE — SYR 30CC LUER LOCK

## (undated) DEVICE — SOL WATER STRL IRR 1000ML

## (undated) DEVICE — PACK SET UP CONVERTORS

## (undated) DEVICE — GOWN SURGICAL X-LARGE

## (undated) DEVICE — TROCAR ENDOPATH XCEL 5MM 7.5CM

## (undated) DEVICE — DRAPE COLUMN DAVINCI XI

## (undated) DEVICE — PACK DRAPE UNIVERSAL CONVERTOR

## (undated) DEVICE — ELECTRODE REM PLYHSV RETURN 9

## (undated) DEVICE — SUT 2/0 30IN SILK BLK BRAI

## (undated) DEVICE — CLIP HEMO-LOK ML

## (undated) DEVICE — NDL 22GA X1 1/2 REG BEVEL

## (undated) DEVICE — SUT SILK 3-0 SH 18IN BLACK

## (undated) DEVICE — SEE MEDLINE ITEM 157148

## (undated) DEVICE — DRAPE SCOPE PILLOW WARMER

## (undated) DEVICE — APPLIER LIGACLIP SM 9.38IN

## (undated) DEVICE — STAPLER SKIN PROXIMATE WIDE

## (undated) DEVICE — TRAY MINOR GEN SURG

## (undated) DEVICE — SYR 60CC W/GAUGE

## (undated) DEVICE — APPLICATOR CHLORAPREP ORN 26ML

## (undated) DEVICE — RELOAD SUREFORM 60 3.5 BLU 6R

## (undated) DEVICE — NDL INSUF ULTRA VERESS 120MM

## (undated) DEVICE — GOWN SMART IMP BREATHABLE XXLG

## (undated) DEVICE — RELOAD SUREFORM 60 4.3 GRN 6R

## (undated) DEVICE — CONTAINER SPECIMEN STRL 4OZ

## (undated) DEVICE — TRAY FOLEY 16FR INFECTION CONT

## (undated) DEVICE — ADHESIVE DERMABOND ADVANCED

## (undated) DEVICE — STAPLER SUREFORM 60 SPU

## (undated) DEVICE — SUT VLOC 90 3-0 CV-23 NDL

## (undated) DEVICE — BAG TISS RETRV MONARCH 10MM

## (undated) DEVICE — SUT 2-0 12-18IN SILK

## (undated) DEVICE — TROCAR ENDOPATH XCEL 12MM 10CM